# Patient Record
Sex: MALE | Race: WHITE | NOT HISPANIC OR LATINO
[De-identification: names, ages, dates, MRNs, and addresses within clinical notes are randomized per-mention and may not be internally consistent; named-entity substitution may affect disease eponyms.]

---

## 2019-02-25 ENCOUNTER — RECORD ABSTRACTING (OUTPATIENT)
Age: 59
End: 2019-02-25

## 2019-02-25 DIAGNOSIS — Z87.898 PERSONAL HISTORY OF OTHER SPECIFIED CONDITIONS: ICD-10-CM

## 2019-02-25 DIAGNOSIS — E03.9 HYPOTHYROIDISM, UNSPECIFIED: ICD-10-CM

## 2019-02-25 DIAGNOSIS — Z86.79 PERSONAL HISTORY OF OTHER DISEASES OF THE CIRCULATORY SYSTEM: ICD-10-CM

## 2019-03-07 ENCOUNTER — APPOINTMENT (OUTPATIENT)
Dept: CARDIOLOGY | Facility: CLINIC | Age: 59
End: 2019-03-07
Payer: COMMERCIAL

## 2019-03-07 VITALS
SYSTOLIC BLOOD PRESSURE: 130 MMHG | WEIGHT: 141 LBS | DIASTOLIC BLOOD PRESSURE: 72 MMHG | HEART RATE: 60 BPM | BODY MASS INDEX: 27.68 KG/M2 | HEIGHT: 60 IN

## 2019-03-07 PROCEDURE — 99212 OFFICE O/P EST SF 10 MIN: CPT | Mod: 25

## 2019-03-07 PROCEDURE — 93351 STRESS TTE COMPLETE: CPT

## 2019-03-07 RX ORDER — AMLODIPINE BESYLATE 5 MG/1
5 TABLET ORAL
Refills: 0 | Status: DISCONTINUED | COMMUNITY
End: 2019-03-07

## 2019-03-07 NOTE — DISCUSSION/SUMMARY
[FreeTextEntry1] : Patient did exceptionally well on today's stress echo. There was no evidence of exercise-induced myocardial ischemia and the patient was able to easily exercise and to Av stage IV. The function was normal at rest and post exercise. I was pleased with those results the patient was advised to maintain a program of regular exercise. His kidney function has been followed closely by a nephrologist.

## 2019-03-07 NOTE — REASON FOR VISIT
[FreeTextEntry1] : Patient comes for followup today including stress echocardiography. The patient has a number of coronary risk factors and has an EKG abnormality consisting of nonspecific ST-T wave abnormalities.

## 2019-03-07 NOTE — PHYSICAL EXAM
[General Appearance - Well Developed] : well developed [Normal Appearance] : normal appearance [Well Groomed] : well groomed [General Appearance - Well Nourished] : well nourished [No Deformities] : no deformities [General Appearance - In No Acute Distress] : no acute distress [Normal Conjunctiva] : the conjunctiva exhibited no abnormalities [Eyelids - No Xanthelasma] : the eyelids demonstrated no xanthelasmas [Normal Oral Mucosa] : normal oral mucosa [No Oral Pallor] : no oral pallor [No Oral Cyanosis] : no oral cyanosis [Normal Jugular Venous A Waves Present] : normal jugular venous A waves present [Normal Jugular Venous V Waves Present] : normal jugular venous V waves present [No Jugular Venous Aranda A Waves] : no jugular venous aranda A waves [Respiration, Rhythm And Depth] : normal respiratory rhythm and effort [Exaggerated Use Of Accessory Muscles For Inspiration] : no accessory muscle use [Auscultation Breath Sounds / Voice Sounds] : lungs were clear to auscultation bilaterally [Heart Rate And Rhythm] : heart rate and rhythm were normal [Heart Sounds] : normal S1 and S2 [Murmurs] : no murmurs present [Abdomen Soft] : soft [Abdomen Tenderness] : non-tender [Abdomen Mass (___ Cm)] : no abdominal mass palpated [Abnormal Walk] : normal gait [Gait - Sufficient For Exercise Testing] : the gait was sufficient for exercise testing [Oriented To Time, Place, And Person] : oriented to person, place, and time [Affect] : the affect was normal [Mood] : the mood was normal [No Anxiety] : not feeling anxious [Skin Color & Pigmentation] : normal skin color and pigmentation [] : no rash [No Venous Stasis] : no venous stasis [Skin Lesions] : no skin lesions [No Skin Ulcers] : no skin ulcer [No Xanthoma] : no  xanthoma was observed

## 2019-06-11 ENCOUNTER — RECORD ABSTRACTING (OUTPATIENT)
Age: 59
End: 2019-06-11

## 2019-06-11 DIAGNOSIS — Z82.49 FAMILY HISTORY OF ISCHEMIC HEART DISEASE AND OTHER DISEASES OF THE CIRCULATORY SYSTEM: ICD-10-CM

## 2019-06-11 DIAGNOSIS — Z83.3 FAMILY HISTORY OF DIABETES MELLITUS: ICD-10-CM

## 2019-06-11 DIAGNOSIS — Z84.2 FAMILY HISTORY OF OTHER DISEASES OF THE GENITOURINARY SYSTEM: ICD-10-CM

## 2019-06-11 DIAGNOSIS — K21.0 GASTRO-ESOPHAGEAL REFLUX DISEASE WITH ESOPHAGITIS: ICD-10-CM

## 2019-06-11 RX ORDER — ATORVASTATIN CALCIUM 20 MG/1
20 TABLET, FILM COATED ORAL
Refills: 0 | Status: DISCONTINUED | COMMUNITY
End: 2019-06-11

## 2019-06-11 RX ORDER — HYDROCHLOROTHIAZIDE 12.5 MG/1
12.5 CAPSULE ORAL
Refills: 0 | Status: DISCONTINUED | COMMUNITY
End: 2019-06-11

## 2019-06-11 RX ORDER — AMLODIPINE BESYLATE 2.5 MG/1
2.5 TABLET ORAL
Refills: 0 | Status: DISCONTINUED | COMMUNITY
End: 2019-06-11

## 2019-06-11 RX ORDER — LOSARTAN POTASSIUM 100 MG/1
100 TABLET, FILM COATED ORAL
Refills: 0 | Status: DISCONTINUED | COMMUNITY
End: 2019-06-11

## 2019-07-29 ENCOUNTER — APPOINTMENT (OUTPATIENT)
Dept: NEPHROLOGY | Facility: CLINIC | Age: 59
End: 2019-07-29

## 2019-09-04 ENCOUNTER — OTHER (OUTPATIENT)
Age: 59
End: 2019-09-04

## 2019-09-17 ENCOUNTER — APPOINTMENT (OUTPATIENT)
Dept: NEPHROLOGY | Facility: CLINIC | Age: 59
End: 2019-09-17
Payer: COMMERCIAL

## 2019-09-17 VITALS
DIASTOLIC BLOOD PRESSURE: 70 MMHG | WEIGHT: 145 LBS | HEART RATE: 72 BPM | BODY MASS INDEX: 24.16 KG/M2 | RESPIRATION RATE: 16 BRPM | HEIGHT: 65 IN | SYSTOLIC BLOOD PRESSURE: 126 MMHG

## 2019-09-17 DIAGNOSIS — Z86.19 PERSONAL HISTORY OF OTHER INFECTIOUS AND PARASITIC DISEASES: ICD-10-CM

## 2019-09-17 DIAGNOSIS — Z78.9 OTHER SPECIFIED HEALTH STATUS: ICD-10-CM

## 2019-09-17 DIAGNOSIS — N18.3 CHRONIC KIDNEY DISEASE, STAGE 3 (MODERATE): ICD-10-CM

## 2019-09-17 DIAGNOSIS — N28.1 CYST OF KIDNEY, ACQUIRED: ICD-10-CM

## 2019-09-17 PROCEDURE — 99214 OFFICE O/P EST MOD 30 MIN: CPT

## 2019-09-17 RX ORDER — IRBESARTAN 300 MG/1
300 TABLET, FILM COATED ORAL
Refills: 0 | Status: DISCONTINUED | COMMUNITY
End: 2019-09-17

## 2019-09-17 NOTE — CONSULT LETTER
[Dear  ___] : Dear  [unfilled], [Please see my note below.] : Please see my note below. [Consult Letter:] : I had the pleasure of evaluating your patient, [unfilled]. [Sincerely,] : Sincerely, [Consult Closing:] : Thank you very much for allowing me to participate in the care of this patient.  If you have any questions, please do not hesitate to contact me. [DrLeon  ___] : Dr. CUMMINGS [FreeTextEntry3] : Earl

## 2019-09-17 NOTE — ASSESSMENT
[FreeTextEntry1] : Ramses Jackson is a 59-year-old,  male who who has a history of late Stage III CKD, now possible stage IV CKD related to urinary reflux. His blood pressures have been well controlled. His renal function has worsened slightly though this may just be related to the warm weather we have been having.  The urine protein to creatinine ratio has again decreased to 494 mg/g (09/12/2019).  Out of concern for the proteinuria in the past, a workup was submitted (SPEP (-), urine immunofixation (-), Hep Bs Ag (-), Hep C antibody (-), CONCEPCIÓN (-), RA (-). An ESR was 6 mm/hour in July 2016.\par \par (1) Solitary functioning kidney with nephrotic-range proteinuria in the past and a negative workup. \par (2) CKD stage IV  secondary to reflux. No active reflux at his last visit to urology.\par (3) Nephrotic range proteinuria in the past. It is currently is 494 mg per gram of creatinine.\par (4) Complex renal cyst. No change in structure on the last imaging study. Low suspicion for a malignancy.\par (5) HbA1c 5.4%. Mr. Jackson does not have diabetes mellitus.\par \par RECOMMENDATIONS:\par \par (1) Continue the current medication regimen.\par \par (2) We talked about ways to protect the kidneys: \par -Good blood pressure control\par -Avoid the use of NSAIDS (ibuprofen, Motrin, Aleve,Celebrex, etc.).  Okay to use Tylenol.\par -Avoid salt 'like the plague'.  Limit sodium intake.  Do not add salt to your food.\par -Limit the intake of animal products.  Avoid high animal protein diets.  I have recommended a diet with 0.8 grams of protein per kg of body mass\par -Stay well hydrated.\par -Good cholesterol control.\par (3) Continue the vitamin D to 2000 IU BID alternating with 2000 IU once daily.\par (4) I ordered repeat lab studies to be done in 3 months. \par \par Mr. Jackson will read the book "The Luther Study" by Dr. KARIS Forde.\par \par

## 2019-09-17 NOTE — PHYSICAL EXAM
[General Appearance - Alert] : alert [Neck Appearance] : the appearance of the neck was normal [Sclera] : the sclera and conjunctiva were normal [General Appearance - In No Acute Distress] : in no acute distress [] : no respiratory distress [Exaggerated Use Of Accessory Muscles For Inspiration] : no accessory muscle use [Respiration, Rhythm And Depth] : normal respiratory rhythm and effort [Heart Sounds] : normal S1 and S2 [Heart Sounds Gallop] : no gallops [Heart Rate And Rhythm] : heart rate was normal and rhythm regular [Heart Sounds Pericardial Friction Rub] : no pericardial rub [Murmurs] : no murmurs [Bowel Sounds] : normal bowel sounds [Edema] : there was no peripheral edema [Abdomen Soft] : soft [Abdomen Tenderness] : non-tender [No Spinal Tenderness] : no spinal tenderness [No CVA Tenderness] : no ~M costovertebral angle tenderness [Involuntary Movements] : no involuntary movements were seen [Abnormal Walk] : normal gait [Skin Color & Pigmentation] : normal skin color and pigmentation [Skin Turgor] : normal skin turgor [No Focal Deficits] : no focal deficits [FreeTextEntry1] : no asterixis or clonus

## 2019-11-12 ENCOUNTER — APPOINTMENT (OUTPATIENT)
Dept: NEPHROLOGY | Facility: CLINIC | Age: 59
End: 2019-11-12
Payer: COMMERCIAL

## 2019-11-12 VITALS
BODY MASS INDEX: 23.32 KG/M2 | WEIGHT: 140 LBS | RESPIRATION RATE: 16 BRPM | SYSTOLIC BLOOD PRESSURE: 122 MMHG | HEART RATE: 60 BPM | OXYGEN SATURATION: 98 % | DIASTOLIC BLOOD PRESSURE: 70 MMHG | HEIGHT: 65 IN

## 2019-11-12 DIAGNOSIS — Z87.440 PERSONAL HISTORY OF URINARY (TRACT) INFECTIONS: ICD-10-CM

## 2019-11-12 DIAGNOSIS — N13.70 VESICOURETERAL-REFLUX, UNSPECIFIED: ICD-10-CM

## 2019-11-12 PROCEDURE — 99214 OFFICE O/P EST MOD 30 MIN: CPT

## 2019-11-12 NOTE — HISTORY OF PRESENT ILLNESS
[FreeTextEntry1] : Ramses Jackson is a 59-year-old, male who has a history of Stage III CKD related to urinary reflux. He called earlier today with complaints of bilateral lower back pain (in the kidney areas) and urinary frequency that has been going on for the past two weeks "on and off" though worsened today.  He denies having any fevers, chills, visible hematuria or dysuria.  His urine is clear and pale yellow in color.  His blood tests were done on November 5, 2019. He was told his serum creatinine is 2.7 mg/dL.

## 2019-11-12 NOTE — PHYSICAL EXAM
[General Appearance - Alert] : alert [General Appearance - In No Acute Distress] : in no acute distress [Sclera] : the sclera and conjunctiva were normal [] : no respiratory distress [Neck Appearance] : the appearance of the neck was normal [Respiration, Rhythm And Depth] : normal respiratory rhythm and effort [Exaggerated Use Of Accessory Muscles For Inspiration] : no accessory muscle use [Heart Rate And Rhythm] : heart rate was normal and rhythm regular [Heart Sounds] : normal S1 and S2 [Heart Sounds Gallop] : no gallops [Heart Sounds Pericardial Friction Rub] : no pericardial rub [Murmurs] : no murmurs [Edema] : there was no peripheral edema [Bowel Sounds] : normal bowel sounds [Abdomen Soft] : soft [Abdomen Tenderness] : non-tender [No CVA Tenderness] : no ~M costovertebral angle tenderness [No Spinal Tenderness] : no spinal tenderness [Abnormal Walk] : normal gait [Skin Color & Pigmentation] : normal skin color and pigmentation [Involuntary Movements] : no involuntary movements were seen [Skin Turgor] : normal skin turgor [No Focal Deficits] : no focal deficits [Oriented To Time, Place, And Person] : oriented to person, place, and time [Impaired Insight] : insight and judgment were intact [Affect] : the affect was normal [Mood] : the mood was normal [FreeTextEntry1] : no asterixis or clonus

## 2019-11-12 NOTE — CONSULT LETTER
[Dear  ___] : Dear  [unfilled], [Courtesy Letter:] : I had the pleasure of seeing your patient, [unfilled], in my office today. [Please see my note below.] : Please see my note below. [Consult Closing:] : Thank you very much for allowing me to participate in the care of this patient.  If you have any questions, please do not hesitate to contact me. [Sincerely,] : Sincerely, [FreeTextEntry3] : Earl

## 2019-11-12 NOTE — ASSESSMENT
[FreeTextEntry1] : Ramses Jackson is a 59-year-old,  male who has stage IV CKD related to prior urinary reflux and a solitary functioning kidney.  His blood pressures continue to be well controlled.  His renal function (see below) has changed only minimally since the early summer.   His recent BUN/creatinine trend is as follows:  31/2.55 (06/10/2019), 29/2.64 (09/12/2019), -/2.7 (most recent labs per patient).\par \par Mr. Jackson is here today because of urinary frequency and back pain. Although the majority of the pain appears to be in the bilateral lower back areas well below the left kidney, some of his discomfort is around the left kidney.  His current history does not seem to be consistent with a urinary tract infection.  I can not rule out either urinary retention in the bladder or urinary reflux.  Neither can I rule out a recent acute kidney injury event that is recovering with large urine production. \par \par Other issues include: \par \par (1) Solitary functioning kidney with nephrotic-range proteinuria in the past and a negative workup. \par (2) CKD stage IV  secondary to reflux. No active reflux at his last visit to urology.\par (3) Complex renal cyst. No change in structure on the last imaging study. Low suspicion for a malignancy.\par \par \par RECOMMENDATIONS:\par \par (1) Renal ultrasound and bladder scan\par (2) Urinalysis and urine culture and sensitivity.\par (3) Urine osmolality.\par \par \par

## 2019-11-12 NOTE — REVIEW OF SYSTEMS
[Feeling Tired] : feeling tired [As Noted in HPI] : as noted in HPI [Joint Pain] : joint pain [Negative] : Heme/Lymph [FreeTextEntry8] : nocturia x 1-2. [FreeTextEntry9] : chronic knee pain, recent pain above and below the knees in addition

## 2020-04-06 ENCOUNTER — APPOINTMENT (OUTPATIENT)
Dept: NEPHROLOGY | Facility: CLINIC | Age: 60
End: 2020-04-06

## 2020-04-06 ENCOUNTER — APPOINTMENT (OUTPATIENT)
Dept: NEPHROLOGY | Facility: CLINIC | Age: 60
End: 2020-04-06
Payer: COMMERCIAL

## 2020-04-06 PROCEDURE — 99214 OFFICE O/P EST MOD 30 MIN: CPT | Mod: 95

## 2020-04-06 NOTE — REVIEW OF SYSTEMS
[As Noted in HPI] : as noted in HPI [Joint Pain] : joint pain [Negative] : Heme/Lymph [FreeTextEntry2] : No longer feels tired.  [FreeTextEntry8] : nocturia x 1-2.  Usually once.  [FreeTextEntry9] : chronic knee pain, this improved with exercise.

## 2020-04-06 NOTE — HISTORY OF PRESENT ILLNESS
[Home] : at home, [unfilled] , at the time of the visit. [Medical Office: ([facility])___] : at ~his/her~ medical office located in [place] [Spouse] : spouse [Patient] : the patient [FreeTextEntry4] : Verónica Schmidt [FreeTextEntry1] : Ramses Jackson is a 59-year-old, male who has a history of Stage III CKD related to urinary reflux. He is feeling well.   His blood pressure this morning was 120/78.  He is without any nausea or vomiting. His full ROS is below.

## 2020-04-06 NOTE — ASSESSMENT
[FreeTextEntry1] : Ramses Jackson is a 59-year-old,  male who has stage IV CKD related to prior urinary reflux and a solitary functioning kidney.  His blood pressures continue to be well controlled.  His renal function (see below) has changed only minimally since the early summer.   His recent BUN/creatinine trend is as follows:  31/2.55 (06/10/2019), 29/2.64 (09/12/2019), -/2.7, 33/2.5 (03/12/2020).   He is without any nausea or vomiting. His blood pressure is well controlled.  His most recent urine protein to creatinine ratio is 975 mg per gram.  His lipid panel is good (total cholesterol 155 mg/dL,  mg/dL, HDL 50 mg/dL, LDL 73 mg/dL).  His vitamin D level is 73 ng/mL (on vitamin 2000 IU twice daily).\par \par Other issues include: \par \par (1) Solitary functioning kidney with nephrotic-range proteinuria in the past and a negative workup. \par (2) CKD stage IV  secondary to reflux. No active reflux at his last visit to urology.\par (3) Complex renal cyst. No change in structure on the last imaging study. Low suspicion for a malignancy.\par \par \par RECOMMENDATIONS:\par \par (1) Lower the vitamin D to 2000 IU once daily.\par (2) Continue the rest of the medical regimen. \par (3) Mr. Jackson saw [facility] in June 2019.  He was told they will see him again when the eGFR is under 20 cc per minute.  \par (4) Repeat lab studies in 3 months. \par \par We talked about the need to avoid contact with people outside of his family, all of whom are remaining at home.

## 2020-05-08 ENCOUNTER — APPOINTMENT (OUTPATIENT)
Dept: CARDIOLOGY | Facility: CLINIC | Age: 60
End: 2020-05-08

## 2020-09-03 ENCOUNTER — APPOINTMENT (OUTPATIENT)
Dept: NEPHROLOGY | Facility: CLINIC | Age: 60
End: 2020-09-03
Payer: COMMERCIAL

## 2020-09-03 VITALS
OXYGEN SATURATION: 98 % | RESPIRATION RATE: 16 BRPM | HEIGHT: 65 IN | BODY MASS INDEX: 23.66 KG/M2 | HEART RATE: 57 BPM | TEMPERATURE: 97.3 F | WEIGHT: 142 LBS | DIASTOLIC BLOOD PRESSURE: 64 MMHG | SYSTOLIC BLOOD PRESSURE: 118 MMHG

## 2020-09-03 PROCEDURE — 99214 OFFICE O/P EST MOD 30 MIN: CPT

## 2020-09-03 NOTE — PHYSICAL EXAM
[General Appearance - Alert] : alert [General Appearance - In No Acute Distress] : in no acute distress [Extraocular Movements] : extraocular movements were intact [Sclera] : the sclera and conjunctiva were normal [] : no respiratory distress [Respiration, Rhythm And Depth] : normal respiratory rhythm and effort [Neck Appearance] : the appearance of the neck was normal [Exaggerated Use Of Accessory Muscles For Inspiration] : no accessory muscle use [Heart Rate And Rhythm] : heart rate was normal and rhythm regular [Auscultation Breath Sounds / Voice Sounds] : lungs were clear to auscultation bilaterally [Heart Sounds Gallop] : no gallops [Heart Sounds] : normal S1 and S2 [Murmurs] : no murmurs [Edema] : there was no peripheral edema [Heart Sounds Pericardial Friction Rub] : no pericardial rub [Normal Sphincter Tone] : normal sphincter tone [No Rectal Mass] : no rectal mass [No CVA Tenderness] : no ~M costovertebral angle tenderness [No Spinal Tenderness] : no spinal tenderness [Abnormal Walk] : normal gait [Involuntary Movements] : no involuntary movements were seen [Nail Clubbing] : no clubbing  or cyanosis of the fingernails [Skin Turgor] : normal skin turgor [Skin Color & Pigmentation] : normal skin color and pigmentation [No Focal Deficits] : no focal deficits [Oriented To Time, Place, And Person] : oriented to person, place, and time [Impaired Insight] : insight and judgment were intact [Mood] : the mood was normal [Affect] : the affect was normal

## 2020-09-03 NOTE — CONSULT LETTER
[Dear  ___] : Dear  [unfilled], [Courtesy Letter:] : I had the pleasure of seeing your patient, [unfilled], in my office today. [Please see my note below.] : Please see my note below. [Consult Closing:] : Thank you very much for allowing me to participate in the care of this patient.  If you have any questions, please do not hesitate to contact me. [DrLeon  ___] : Dr. CUMMINGS [Sincerely,] : Sincerely, [FreeTextEntry3] : Earl

## 2020-09-03 NOTE — REVIEW OF SYSTEMS
[As Noted in HPI] : as noted in HPI [Negative] : Heme/Lymph [FreeTextEntry8] : nocturia x 0-2.  Usually once.  [FreeTextEntry9] : prior knee pain has resolved

## 2020-09-03 NOTE — ASSESSMENT
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  His blood pressures continue to be well controlled.  His renal function (see below) has changed only minimally since the early summer.   His recent BUN/creatinine trend is as follows:  31/2.55 (06/10/2019), 29/2.64 (09/12/2019), -/2.7, 33/2.5 (03/12/2020), and most recently 31/12.50 (08/28/2020).   He is without any nausea or vomiting. His blood pressure is well controlled.  His most recent urine protein to creatinine ratio is 885 mg per gram.  His lipid panel is good (total cholesterol 160 mg/dL,  mg/dL, HDL 48 mg/dL, LDL 85 mg/dL).  His vitamin D level is 63.9 ng/mL (on vitamin 2000 IU once daily).\par \par Other issues include: \par \par (1) Solitary functioning kidney with nephrotic-range proteinuria in the past and a negative workup. \par (2) CKD stage IV  secondary to reflux. No active reflux at his last visit to urology.  Currently stable renal function.\par (3) Complex renal cyst. No change in structure on the last imaging study. Low suspicion for a malignancy.\par \par \par RECOMMENDATIONS:\par \par (1) I made no changes to the medication regimen.\par (2) We talked about home dialysis (PD and HD).\par (3) Mr. Jackson saw [facility] in June 2019.  He was told they will see him again when the eGFR is under 20 cc per minute.  \par (4) We talked about ways to protect the kidneys:\par -Good blood pressure control\par -Avoid the use of NSAIDS (ibuprofen, Motrin, Aleve,Celebrex, etc.).  Okay to use Tylenol.\par -Avoid salt 'like the plague'.  Limit sodium intake.  Do not add salt to your food.\par -Limit the intake of animal products.  Avoid high animal protein diets.\par -Stay well hydrated.\par -Good cholesterol control.\par (5) Repeat lab studies in 3 months. \par \par We talked about the need to avoid contact with people outside of his family, all of whom are remaining at home.

## 2020-09-03 NOTE — HISTORY OF PRESENT ILLNESS
[FreeTextEntry1] : Ramses Jackson is a 60-year-old, male who has a history of CKD related to urinary reflux. He is feeling well. His last blood pressure at home is good.  The systolic pressures run under 120 mm Hg, the diastolic pressures run no higher than 80 mm Hg.   He is without any nausea or vomiting. His full ROS is below.

## 2020-09-17 ENCOUNTER — APPOINTMENT (OUTPATIENT)
Dept: NEPHROLOGY | Facility: CLINIC | Age: 60
End: 2020-09-17
Payer: COMMERCIAL

## 2020-09-17 VITALS
HEART RATE: 67 BPM | SYSTOLIC BLOOD PRESSURE: 146 MMHG | DIASTOLIC BLOOD PRESSURE: 70 MMHG | BODY MASS INDEX: 24.16 KG/M2 | OXYGEN SATURATION: 98 % | RESPIRATION RATE: 16 BRPM | WEIGHT: 145 LBS | TEMPERATURE: 98.1 F | HEIGHT: 65 IN

## 2020-09-17 VITALS — SYSTOLIC BLOOD PRESSURE: 134 MMHG | DIASTOLIC BLOOD PRESSURE: 66 MMHG

## 2020-09-17 PROCEDURE — 99213 OFFICE O/P EST LOW 20 MIN: CPT

## 2020-09-17 NOTE — HISTORY OF PRESENT ILLNESS
[FreeTextEntry1] : Ramses Jackson is a 60-year-old, male who has a history of CKD related to urinary reflux. He is feeling well. His last blood pressure at home is good.  The systolic generally run under 120 mm Hg, the diastolic pressures was running no higher than 80 mm Hg. He reports having a high salt meal 3 days ago. Since then his blood pressures have been elevated and running between 140/77 and 168/80.  He took an extra dose of telmisartan 80 mg and his blood pressure decreased to 118/68.  He feels well and is without complaints.

## 2020-09-17 NOTE — ASSESSMENT
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  His blood pressures continue to be well controlled.  His renal function (see below) has changed only minimally since the early summer.   His recent BUN/creatinine trend is as follows:  31/2.55 (06/10/2019), 29/2.64 (09/12/2019), -/2.7, 33/2.5 (03/12/2020), and most recently 31/2.50 (08/28/2020).   He is without any nausea or vomiting. His blood pressure is well controlled.  His most recent urine protein to creatinine ratio is 885 mg per gram.  His lipid panel is good (total cholesterol 160 mg/dL,  mg/dL, HDL 48 mg/dL, LDL 85 mg/dL).  His vitamin D level is 63.9 ng/mL (on vitamin 2000 IU once daily).\par \par Other issues include: \par \par (1) Solitary functioning kidney with nephrotic-range proteinuria in the past and a negative workup. \par (2) CKD stage IV  secondary to reflux. No active reflux at his last visit to urology.  Currently stable renal function.\par (3) Complex renal cyst. No change in structure on the last imaging study. Low suspicion for a malignancy.\par (4) Recently elevated blood pressures (following a high salt meal).  Blood pressure on his Omron is 144/80 with a pulse of 62.  His blood pressure is fairly close to our office measurements. I repeated the blood pressure on the right arm: 136/68.\par \par \par RECOMMENDATIONS:\par \par (1) Take telmisartan 80 mg once daily.\par (2) I gave Mr. Jackson a prescriiption for  hydrochlorothiazide 12.5 mg once daily.  He will not take it unless his blood pressures continue to run high at home.  Prior to starting it, he will call me. \par (3) Repeat the BMP in one month.\par (4) We talked about home dialysis (PD and HD) during our last meeiting.  I did not readress it here.\par (5) Mr. Jackson saw [facility] in June 2019.  He was told they will see him again when the eGFR is under 20 cc per minute.  \par (6 We talked about ways to protect the kidneys:\par -Good blood pressure control\par -Avoid the use of NSAIDS (ibuprofen, Motrin, Aleve,Celebrex, etc.).  Okay to use Tylenol.\par -Avoid salt 'like the plague'.  Limit sodium intake.  Do not add salt to your food.\par -Limit the intake of animal products.  Avoid high animal protein diets.\par -Stay well hydrated.\par -Good cholesterol control.\par

## 2020-09-17 NOTE — CONSULT LETTER
[Dear  ___] : Dear  [unfilled], [Courtesy Letter:] : I had the pleasure of seeing your patient, [unfilled], in my office today. [Please see my note below.] : Please see my note below. [Consult Closing:] : Thank you very much for allowing me to participate in the care of this patient.  If you have any questions, please do not hesitate to contact me. [Sincerely,] : Sincerely, [FreeTextEntry3] : Earl [DrLeon  ___] : Dr. CUMMINGS

## 2020-09-17 NOTE — PHYSICAL EXAM
[General Appearance - Alert] : alert [General Appearance - In No Acute Distress] : in no acute distress [Extraocular Movements] : extraocular movements were intact [Neck Appearance] : the appearance of the neck was normal [] : no respiratory distress [Respiration, Rhythm And Depth] : normal respiratory rhythm and effort [Exaggerated Use Of Accessory Muscles For Inspiration] : no accessory muscle use [Auscultation Breath Sounds / Voice Sounds] : lungs were clear to auscultation bilaterally [Heart Rate And Rhythm] : heart rate was normal and rhythm regular [Heart Sounds] : normal S1 and S2 [Heart Sounds Gallop] : no gallops [Murmurs] : no murmurs [Heart Sounds Pericardial Friction Rub] : no pericardial rub [Edema] : there was no peripheral edema [Normal Sphincter Tone] : normal sphincter tone [No Rectal Mass] : no rectal mass [Abnormal Walk] : normal gait [Involuntary Movements] : no involuntary movements were seen [Skin Color & Pigmentation] : normal skin color and pigmentation [Skin Turgor] : normal skin turgor [No Focal Deficits] : no focal deficits [Oriented To Time, Place, And Person] : oriented to person, place, and time [Impaired Insight] : insight and judgment were intact [Affect] : the affect was normal [Mood] : the mood was normal

## 2020-12-01 ENCOUNTER — APPOINTMENT (OUTPATIENT)
Dept: NEPHROLOGY | Facility: CLINIC | Age: 60
End: 2020-12-01
Payer: COMMERCIAL

## 2020-12-01 VITALS
HEIGHT: 65 IN | RESPIRATION RATE: 16 BRPM | SYSTOLIC BLOOD PRESSURE: 148 MMHG | BODY MASS INDEX: 23.99 KG/M2 | OXYGEN SATURATION: 100 % | DIASTOLIC BLOOD PRESSURE: 70 MMHG | TEMPERATURE: 98 F | WEIGHT: 144 LBS | HEART RATE: 62 BPM

## 2020-12-01 PROCEDURE — 99072 ADDL SUPL MATRL&STAF TM PHE: CPT

## 2020-12-01 PROCEDURE — 99214 OFFICE O/P EST MOD 30 MIN: CPT

## 2020-12-01 RX ORDER — TELMISARTAN 80 MG/1
80 TABLET ORAL DAILY
Refills: 0 | Status: DISCONTINUED | COMMUNITY
End: 2020-12-01

## 2020-12-01 NOTE — CONSULT LETTER
[Dear  ___] : Dear  [unfilled], [Courtesy Letter:] : I had the pleasure of seeing your patient, [unfilled], in my office today. [Please see my note below.] : Please see my note below. [Consult Closing:] : Thank you very much for allowing me to participate in the care of this patient.  If you have any questions, please do not hesitate to contact me. [Sincerely,] : Sincerely, [DrLeon  ___] : Dr. CUMMINGS [FreeTextEntry3] : Earl

## 2020-12-01 NOTE — HISTORY OF PRESENT ILLNESS
[FreeTextEntry1] : Ramses Jackson is a 60-year-old, male who has a history of CKD related to urinary reflux. He is feeling well. He is here regarding a recent worsening of his serum creatinine.  He had a "little fever" around 6-7 weeks ago and took Tylenol only.  He has not had any diarrhea.  He has had no recent changes to his medication regimen.  Although HCTZ 12.5 mg daily is listed in his medication regimen, he has not taken it.  He stopped taking Micardis today. His blood pressures at home have been running around 110-115/70.  He increased the dose of an Ayurvedic (Muktavati) medication around 2 months ago.   Mr. Jackson feels "okay but a little concerned".

## 2020-12-01 NOTE — ASSESSMENT
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   His recent BUN/creatinine trend is as follows:  31/2.55 (06/10/2019), 29/2.64 (09/12/2019), -/2.7, 33/2.5 (03/12/2020),  31/2.50 (08/28/2020), and most recently 40/4.16 (11/24/2020).  He is without any nausea or vomiting. His blood pressure has been well controlled on an ARB though is mildly elevated today, the first day that he is not taking it.  He has been taking an Ayurvedic medication, the dose of which he doubled around 2 months.ago.   \par \par Other issues include: \par \par (1) Solitary functioning kidney with nephrotic-range proteinuria in the past and a negative workup. \par (2) CKD stage IV  secondary to reflux. No active reflux at his last visit to urology.  \par (3) KYLE of unclear etiology. \par (4) Complex renal cyst. No change in structure on the last imaging study. Low suspicion for a malignancy.\par (5) Hypertension\par \par RECOMMENDATIONS:\par \par (1) Stop Telmisartan\par (2) Stop the Lizzy Vati for now. \par (3) Stay well hydrated.\par (4) Start amlodipine 5 mg once daily\par (5) Repeat blood tests below in 1 week\par (6) Repeat renal ultrasound\par (7) We talked about home dialysis (PD and HD) during a prior meeiting.  I did not readress it here.\par (8) Mr. Jackson saw [facility] in June 2019.  He was told they will see him again when the eGFR is under 20 cc per minute.  \par (9) Limit potassium in the diet\par (10) Return in 1 month

## 2020-12-03 ENCOUNTER — APPOINTMENT (OUTPATIENT)
Dept: NEPHROLOGY | Facility: CLINIC | Age: 60
End: 2020-12-03
Payer: COMMERCIAL

## 2020-12-21 PROBLEM — Z87.440 HISTORY OF URINARY TRACT INFECTION: Status: RESOLVED | Noted: 2019-06-11 | Resolved: 2020-12-21

## 2021-01-05 ENCOUNTER — APPOINTMENT (OUTPATIENT)
Dept: NEPHROLOGY | Facility: CLINIC | Age: 61
End: 2021-01-05
Payer: COMMERCIAL

## 2021-01-05 VITALS
HEART RATE: 75 BPM | DIASTOLIC BLOOD PRESSURE: 64 MMHG | WEIGHT: 137 LBS | SYSTOLIC BLOOD PRESSURE: 138 MMHG | TEMPERATURE: 98 F | RESPIRATION RATE: 16 BRPM | OXYGEN SATURATION: 99 % | BODY MASS INDEX: 22.82 KG/M2 | HEIGHT: 65 IN

## 2021-01-05 DIAGNOSIS — R35.0 FREQUENCY OF MICTURITION: ICD-10-CM

## 2021-01-05 PROCEDURE — 99072 ADDL SUPL MATRL&STAF TM PHE: CPT

## 2021-01-05 PROCEDURE — 99214 OFFICE O/P EST MOD 30 MIN: CPT

## 2021-01-05 RX ORDER — AMLODIPINE BESYLATE 10 MG/1
10 TABLET ORAL
Refills: 0 | Status: DISCONTINUED | COMMUNITY
End: 2021-01-05

## 2021-01-05 RX ORDER — AMLODIPINE BESYLATE 5 MG/1
5 TABLET ORAL DAILY
Qty: 90 | Refills: 1 | Status: DISCONTINUED | COMMUNITY
Start: 2020-12-01 | End: 2021-01-05

## 2021-01-05 NOTE — ASSESSMENT
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   His recent BUN/creatinine trend is as follows:  31/2.55 (06/10/2019), 29/2.64 (09/12/2019), -/2.7, 33/2.5 (03/12/2020),  31/2.50 (08/28/2020),  40/4.16 (11/24/2020).  Following the labs of 11/24 his ARB and thiazide diuretic were stopped.  He is currently taking the ARB with amlodipine.  The repeat BUN/creatinine levels are currently 32/2.56 (01/04/2020). He is without any nausea or vomiting. His blood pressure has been mildly elevated though improved on the current regimen.  He is struggling with nocturia and urinary frequency and constipation. \par \par Other issues include: \par \par (1) Solitary functioning kidney with nephrotic-range proteinuria in the past and a negative workup. \par (2) CKD stage IV  secondary to reflux. No active reflux at his last visit to urology.  \par (3) KYLE has resolved. \par (4) Complex renal cyst. No change in structure on the last imaging study. Low suspicion for a malignancy.\par (5) Hypertension Blood pressure is mildly above goa. \par \par RECOMMENDATIONS:\par \par (1) Continue telmisartan 80 mg once daily. \par (2) Stop the amlodipine.\par (3) Start carvedilol 3.125 mg twice daily\par (4) Stay well hydrated.\par (5) Keep salt out of the diet. \par (6) Follow the blood pressures at home.\par (7) We talked about home dialysis (PD and HD) during a prior meeiting.  I did not readdress it today.\par (8) Mr. Jackson saw [facility] in June 2019.  He was told they will see him again when the eGFR is under 20 cc per minute.  \par

## 2021-01-05 NOTE — CONSULT LETTER
[Courtesy Letter:] : I had the pleasure of seeing your patient, [unfilled], in my office today. [Please see my note below.] : Please see my note below. [Consult Closing:] : Thank you very much for allowing me to participate in the care of this patient.  If you have any questions, please do not hesitate to contact me. [Sincerely,] : Sincerely, [DrLeon  ___] : Dr. CUMMINGS [Dear  ___] : Dear  [unfilled], [FreeTextEntry3] : Earl

## 2021-01-05 NOTE — PHYSICAL EXAM
[General Appearance - Alert] : alert [General Appearance - In No Acute Distress] : in no acute distress [Extraocular Movements] : extraocular movements were intact [Neck Appearance] : the appearance of the neck was normal [] : no respiratory distress [Respiration, Rhythm And Depth] : normal respiratory rhythm and effort [Exaggerated Use Of Accessory Muscles For Inspiration] : no accessory muscle use [Auscultation Breath Sounds / Voice Sounds] : lungs were clear to auscultation bilaterally [Heart Rate And Rhythm] : heart rate was normal and rhythm regular [Heart Sounds] : normal S1 and S2 [Heart Sounds Gallop] : no gallops [Murmurs] : no murmurs [Heart Sounds Pericardial Friction Rub] : no pericardial rub [Edema] : there was no peripheral edema [Normal Sphincter Tone] : normal sphincter tone [No Rectal Mass] : no rectal mass [Abnormal Walk] : normal gait [Involuntary Movements] : no involuntary movements were seen [Skin Color & Pigmentation] : normal skin color and pigmentation [Skin Turgor] : normal skin turgor [No Focal Deficits] : no focal deficits [Oriented To Time, Place, And Person] : oriented to person, place, and time [Impaired Insight] : insight and judgment were intact [Affect] : the affect was normal [Mood] : the mood was normal [Bowel Sounds] : normal bowel sounds [Abdomen Soft] : soft [Abdomen Tenderness] : non-tender

## 2021-01-05 NOTE — REVIEW OF SYSTEMS
[As Noted in HPI] : as noted in HPI [Negative] : Heme/Lymph [Lower Ext Edema] : lower extremity edema [Constipation] : constipation [FreeTextEntry8] : nocturia x 3-4,  urinary frequency during the days

## 2021-01-05 NOTE — HISTORY OF PRESENT ILLNESS
[FreeTextEntry1] : Ramses Jackson is a 60-year-old, male who has a history of CKD related to urinary reflux.  I saw him on 12/03/2020 regarding a worsening of the renal function.  I had him stop the Micardis and hydrochlorothiazide.  His blood pressures have recently been elevated.  The amlodipine started in December was increased to 10 mg daily and Micardis was added back at 80 mg daily.  He is currently feeling "okay".  He feels a little nervous. He has developed urinary frequency since starting amlodipine. \par

## 2021-01-28 RX ORDER — CARVEDILOL 3.12 MG/1
3.12 TABLET, FILM COATED ORAL TWICE DAILY
Qty: 180 | Refills: 0 | Status: DISCONTINUED | COMMUNITY
Start: 2021-01-05 | End: 2021-01-28

## 2021-04-06 ENCOUNTER — APPOINTMENT (OUTPATIENT)
Dept: NEPHROLOGY | Facility: CLINIC | Age: 61
End: 2021-04-06
Payer: COMMERCIAL

## 2021-04-06 VITALS
SYSTOLIC BLOOD PRESSURE: 118 MMHG | WEIGHT: 132 LBS | BODY MASS INDEX: 21.99 KG/M2 | HEART RATE: 50 BPM | OXYGEN SATURATION: 99 % | DIASTOLIC BLOOD PRESSURE: 56 MMHG | HEIGHT: 65 IN | TEMPERATURE: 97.5 F | RESPIRATION RATE: 16 BRPM

## 2021-04-06 PROCEDURE — 99072 ADDL SUPL MATRL&STAF TM PHE: CPT

## 2021-04-06 PROCEDURE — 99214 OFFICE O/P EST MOD 30 MIN: CPT

## 2021-04-06 NOTE — HISTORY OF PRESENT ILLNESS
[FreeTextEntry1] : Ramses Jackson is a 60-year-old, male who has a history of CKD related to urinary reflux.  I saw him on 12/03/2020 regarding a worsening of the renal function.  I had him stop the Micardis and hydrochlorothiazide.  His blood pressures increased and I modified his medication regimen.\par \par Mr. Jackson is here for follow up. He feels well although at times he feels tired.  His heart rate was dipping into the 40's and he cut his metoprolol dose in half.  His blood pressures increased.    \par

## 2021-04-06 NOTE — ASSESSMENT
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   His recent BUN/creatinine trend is as follows:  31/2.55 (06/10/2019), 29/2.64 (09/12/2019), -/2.7, 33/2.5 (03/12/2020),  31/2.50 (08/28/2020),  40/4.16 (11/24/2020).  Following the labs of 11/24 his ARB and thiazide diuretic were stopped.  He is currently taking the ARB with metoprolol. The repeat BUN/creatinine levels have trended as follows: 32/2.56 (01/04/2020), 262.33 (02/08/2021), 31/2.46 (04/02/2021). . He is without any nausea or vomiting. His blood pressure has been excellent (with an occasional elevated blood pressure at nights or when he wakes up because he is unable to sleep).   His constipation has been controlled with Metamucil.  \par \par Other issues include: \par \par (1) Solitary functioning kidney with nephrotic-range proteinuria in the past and a negative workup. \par (2) CKD stage IV  secondary to reflux. No active reflux at his last visit to urology.  \par (3) KYLE has resolved. \par (4) Complex renal cyst. No change in structure on the last imaging study. Low suspicion for a malignancy.\par (5) Hypertension Blood pressure is excellent today. Mr. Jackson became bradycardic on carvedilol 3.125 mg BID\par (6) Feeling tired. The beta blocker might be contributing to this. \par \par RECOMMENDATIONS:\par \par (1) Continue telmisartan 80 mg once daily. \par (2) Decrease the metoprolol succinate from 25 mg daily to 12.5 mg daily. \par (3) Restart amlodipine at the dose of 2.5 mg daily.\par (4) Keep salt out of the diet. \par (5) Follow the blood pressures at home.\par (6) We talked about home dialysis (PD and HD) during a prior meeiting.  I did not readdress it today.\par (8) Mr. Jackson saw [facility] in June 2019.  He was told they will see him again when the eGFR is under 20 cc per minute.  It is currently 27 cc per minute. \par

## 2021-04-06 NOTE — PHYSICAL EXAM
[General Appearance - Alert] : alert [General Appearance - In No Acute Distress] : in no acute distress [Sclera] : the sclera and conjunctiva were normal [Extraocular Movements] : extraocular movements were intact [Neck Appearance] : the appearance of the neck was normal [] : no respiratory distress [Respiration, Rhythm And Depth] : normal respiratory rhythm and effort [Exaggerated Use Of Accessory Muscles For Inspiration] : no accessory muscle use [Auscultation Breath Sounds / Voice Sounds] : lungs were clear to auscultation bilaterally [Heart Rate And Rhythm] : heart rate was normal and rhythm regular [Heart Sounds] : normal S1 and S2 [Heart Sounds Gallop] : no gallops [Murmurs] : no murmurs [Heart Sounds Pericardial Friction Rub] : no pericardial rub [Edema] : there was no peripheral edema [Bowel Sounds] : normal bowel sounds [Abdomen Soft] : soft [Abdomen Tenderness] : non-tender [Normal Sphincter Tone] : normal sphincter tone [No Rectal Mass] : no rectal mass [Abnormal Walk] : normal gait [Involuntary Movements] : no involuntary movements were seen [Skin Color & Pigmentation] : normal skin color and pigmentation [Skin Turgor] : normal skin turgor [No Focal Deficits] : no focal deficits [Impaired Insight] : insight and judgment were intact [Oriented To Time, Place, And Person] : oriented to person, place, and time [Affect] : the affect was normal [Mood] : the mood was normal

## 2021-04-06 NOTE — REVIEW OF SYSTEMS
[Feeling Tired] : feeling tired [Lower Ext Edema] : lower extremity edema [As Noted in HPI] : as noted in HPI [Negative] : Heme/Lymph [FreeTextEntry2] : Lost 12-13 pounds over the past 3-4 months.  He is unsure why.  He cut salt out of his diet.  Otherwise, his diet has not changed.  [FreeTextEntry7] : appeitite has been good.  No N/V. Does not like eating without salt.  Believes he eats less food without salt.   [FreeTextEntry8] : nocturia x 1-2,  urinates every 3-4 hours during the days, drinks a lot of water

## 2021-04-13 ENCOUNTER — NON-APPOINTMENT (OUTPATIENT)
Age: 61
End: 2021-04-13

## 2021-04-13 ENCOUNTER — APPOINTMENT (OUTPATIENT)
Dept: CARDIOLOGY | Facility: CLINIC | Age: 61
End: 2021-04-13
Payer: COMMERCIAL

## 2021-04-13 VITALS
BODY MASS INDEX: 21.83 KG/M2 | TEMPERATURE: 98.6 F | DIASTOLIC BLOOD PRESSURE: 70 MMHG | HEIGHT: 65 IN | SYSTOLIC BLOOD PRESSURE: 130 MMHG | HEART RATE: 60 BPM | WEIGHT: 131 LBS

## 2021-04-13 DIAGNOSIS — R73.9 HYPERGLYCEMIA, UNSPECIFIED: ICD-10-CM

## 2021-04-13 PROCEDURE — 99213 OFFICE O/P EST LOW 20 MIN: CPT | Mod: 25

## 2021-04-13 PROCEDURE — 93306 TTE W/DOPPLER COMPLETE: CPT

## 2021-04-13 PROCEDURE — 99072 ADDL SUPL MATRL&STAF TM PHE: CPT

## 2021-04-13 NOTE — PHYSICAL EXAM
[General Appearance - Well Developed] : well developed [Normal Appearance] : normal appearance [Well Groomed] : well groomed [General Appearance - Well Nourished] : well nourished [No Deformities] : no deformities [General Appearance - In No Acute Distress] : no acute distress [Normal Conjunctiva] : the conjunctiva exhibited no abnormalities [Eyelids - No Xanthelasma] : the eyelids demonstrated no xanthelasmas [Normal Oral Mucosa] : normal oral mucosa [No Oral Pallor] : no oral pallor [No Oral Cyanosis] : no oral cyanosis [Normal Jugular Venous A Waves Present] : normal jugular venous A waves present [Normal Jugular Venous V Waves Present] : normal jugular venous V waves present [No Jugular Venous Aranda A Waves] : no jugular venous aranda A waves [Respiration, Rhythm And Depth] : normal respiratory rhythm and effort [Exaggerated Use Of Accessory Muscles For Inspiration] : no accessory muscle use [Auscultation Breath Sounds / Voice Sounds] : lungs were clear to auscultation bilaterally [Heart Rate And Rhythm] : heart rate and rhythm were normal [Heart Sounds] : normal S1 and S2 [Murmurs] : no murmurs present [Abdomen Soft] : soft [Abdomen Tenderness] : non-tender [Abdomen Mass (___ Cm)] : no abdominal mass palpated [Abnormal Walk] : normal gait [Gait - Sufficient For Exercise Testing] : the gait was sufficient for exercise testing [Nail Clubbing] : no clubbing of the fingernails [Cyanosis, Localized] : no localized cyanosis [Petechial Hemorrhages (___cm)] : no petechial hemorrhages [Skin Color & Pigmentation] : normal skin color and pigmentation [] : no rash [No Venous Stasis] : no venous stasis [Skin Lesions] : no skin lesions [No Skin Ulcers] : no skin ulcer [No Xanthoma] : no  xanthoma was observed [Oriented To Time, Place, And Person] : oriented to person, place, and time [Affect] : the affect was normal [Mood] : the mood was normal [No Anxiety] : not feeling anxious

## 2021-04-13 NOTE — DISCUSSION/SUMMARY
[FreeTextEntry1] : The patient returns for followup today including stress echo. His baseline EKG reveals chronically nonspecific ST-T wave abnormalities. There was no clinical or echocardiographic evidence of exercise-induced myocardial ischemia and the patient has excellent exercise capacity. The patient's most recent lab data reveals a BUN of 31 creatinine 2.46. He is being followed closely by a nephrologist Dr. SANTOS.\par Order to maximize his protection against future cardiovascular event recommending that resume statin be increased to 10 mg and that the patient resume baby aspirin 81 mg per we will continue to follow his progress on an annual basis.

## 2021-04-13 NOTE — REASON FOR VISIT
[FreeTextEntry1] : Patient comes for followup including stress echocardiography. He is followed with a number of coronary risk factors especially hypertension and chronic kidney disease. The latter is related to circulatory ureteral reflux that was treated surgically a number of years ago. Renal function has been relatively stable. The patient has had no acute cardiac symptoms.\par \par No symptoms of acute chest pain shortness of breath or palpitations. He is tolerating rosuvastatin 5 mg without difficulty (he had difficulty with atorvastatin)

## 2021-07-22 ENCOUNTER — APPOINTMENT (OUTPATIENT)
Dept: NEPHROLOGY | Facility: CLINIC | Age: 61
End: 2021-07-22
Payer: COMMERCIAL

## 2021-07-22 VITALS
BODY MASS INDEX: 22.66 KG/M2 | TEMPERATURE: 98 F | DIASTOLIC BLOOD PRESSURE: 68 MMHG | WEIGHT: 136 LBS | HEART RATE: 74 BPM | HEIGHT: 65 IN | OXYGEN SATURATION: 99 % | RESPIRATION RATE: 16 BRPM | SYSTOLIC BLOOD PRESSURE: 126 MMHG

## 2021-07-22 VITALS — HEART RATE: 60 BPM | SYSTOLIC BLOOD PRESSURE: 130 MMHG | DIASTOLIC BLOOD PRESSURE: 64 MMHG

## 2021-07-22 PROCEDURE — 99072 ADDL SUPL MATRL&STAF TM PHE: CPT

## 2021-07-22 PROCEDURE — 99214 OFFICE O/P EST MOD 30 MIN: CPT

## 2021-07-22 NOTE — HISTORY OF PRESENT ILLNESS
[FreeTextEntry1] : Ramses Jackson is a 60-year-old, male who has a history of CKD related to urinary reflux.  I last saw him on 04/06/2021.  Prior to that visit he stopped his Micardis and regarding a worsening of the renal function.  I had him stop the Micardis and hydrochlorothiazide for a period of time.  His blood pressure increased.  I put him on metoprolol succinate.  He stopped the metoprolol succinate after his heart rate dropped to 42 bpm. Telmisartan was restarted and amlodipine was added.  Mr. Jackson's blood pressures have been running around 110/58-65.  He feels well.  \par \par

## 2021-07-22 NOTE — ASSESSMENT
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   His recent BUN/creatinine trend is as follows:  31/2.55 (06/10/2019), 29/2.64 (09/12/2019), -/2.7, 33/2.5 (03/12/2020),  31/2.50 (08/28/2020),  40/4.16 (11/24/2020),   Following the labs of 11/24 his ARB and thiazide diuretic were stopped.  He is currently taking the ARB with amlodipine. The repeat BUN/creatinine levels have trended as follows: 32/2.56 (01/04/2020), 262.33 (02/08/2021), 31/2.46 (04/02/2021), 32/2.56 (07/15/2021). The current eGFR is 26 cc per minute.  He is without any nausea or vomiting. His blood pressures has been excellent.  \par \par Other issues include: \par \par (1) Solitary functioning kidney with nephrotic-range proteinuria in the past and a negative workup. \par (2) CKD stage IV  secondary to reflux. No active reflux at his last visit to urology.  \par (3) KYLE has resolved. \par (4) Complex renal cyst. No change in structure on the last imaging study. Low suspicion for a malignancy.\par (5) Hypertension Blood pressure is excellent today.\par (6) Feeling tired. This improved off of the beta blocker. \par \par RECOMMENDATIONS:\par \par (1) Continue telmisartan 80 mg once daily. \par (2) Continue amlodipine 2.5 mg once daily.\par (3) Keep salt out of the diet.  Limit animal protein in the diet. \par (4) Follow the blood pressures at home.\par (5) We talked about home dialysis (PD and HD) during a prior meeting.  I did not readdress it today.\par (6) Mr. Jackson saw [facility] in June 2019.  He reports he has been placed on the waiting list. \par

## 2021-07-22 NOTE — PHYSICAL EXAM
[General Appearance - Alert] : alert [General Appearance - In No Acute Distress] : in no acute distress [Sclera] : the sclera and conjunctiva were normal [Extraocular Movements] : extraocular movements were intact [Neck Appearance] : the appearance of the neck was normal [] : no respiratory distress [Respiration, Rhythm And Depth] : normal respiratory rhythm and effort [Exaggerated Use Of Accessory Muscles For Inspiration] : no accessory muscle use [Auscultation Breath Sounds / Voice Sounds] : lungs were clear to auscultation bilaterally [Heart Rate And Rhythm] : heart rate was normal and rhythm regular [Heart Sounds] : normal S1 and S2 [Heart Sounds Gallop] : no gallops [Murmurs] : no murmurs [Heart Sounds Pericardial Friction Rub] : no pericardial rub [Edema] : there was no peripheral edema [Bowel Sounds] : normal bowel sounds [Abdomen Soft] : soft [Abdomen Tenderness] : non-tender [Normal Sphincter Tone] : normal sphincter tone [No Rectal Mass] : no rectal mass [Abnormal Walk] : normal gait [Involuntary Movements] : no involuntary movements were seen [Skin Color & Pigmentation] : normal skin color and pigmentation [Skin Turgor] : normal skin turgor [No Focal Deficits] : no focal deficits [Oriented To Time, Place, And Person] : oriented to person, place, and time [Impaired Insight] : insight and judgment were intact [Affect] : the affect was normal [Mood] : the mood was normal

## 2021-07-22 NOTE — REVIEW OF SYSTEMS
[Feeling Tired] : feeling tired [As Noted in HPI] : as noted in HPI [Negative] : Heme/Lymph [FreeTextEntry2] : Weight has been stable [FreeTextEntry7] : appeitite has been good.  No N/V.  [FreeTextEntry8] : nocturia x 1,  urinates every 3-4 hours during the days, drinks a lot of water

## 2021-09-28 ENCOUNTER — APPOINTMENT (OUTPATIENT)
Dept: NEPHROLOGY | Facility: CLINIC | Age: 61
End: 2021-09-28
Payer: COMMERCIAL

## 2021-09-28 VITALS
BODY MASS INDEX: 22.16 KG/M2 | TEMPERATURE: 98 F | DIASTOLIC BLOOD PRESSURE: 62 MMHG | SYSTOLIC BLOOD PRESSURE: 118 MMHG | HEART RATE: 57 BPM | WEIGHT: 133 LBS | HEIGHT: 65 IN | OXYGEN SATURATION: 99 % | RESPIRATION RATE: 16 BRPM

## 2021-09-28 PROCEDURE — 99214 OFFICE O/P EST MOD 30 MIN: CPT

## 2021-09-28 NOTE — REASON FOR VISIT
[Follow-Up] : a follow-up visit [Spouse] : spouse [Family Member] : family member [Other: _____] : [unfilled]

## 2021-09-28 NOTE — REVIEW OF SYSTEMS
[Feeling Tired] : feeling tired [As Noted in HPI] : as noted in HPI [Negative] : Heme/Lymph [FreeTextEntry2] : Weight has decreased from 142 to 129.5 pounds over the past 6 months.  Appetite has been "pretty good" [FreeTextEntry7] : appetite has been good.  No N/V.  [FreeTextEntry8] : nocturia x 1-2,  urinates every 3-4 hours during the days, drinks a lot of water

## 2021-09-28 NOTE — HISTORY OF PRESENT ILLNESS
[FreeTextEntry1] : Ramses Jackson is a 61-year-old, male who has a history of CKD related to urinary reflux.  I last saw him on 07/22/2021.   I had him stop the Micardis and hydrochlorothiazide due to worsening renal function late in 2020. His blood pressure increased.  I put him on metoprolol succinate.  He stopped the metoprolol succinate after his heart rate dropped to 42 bpm. Telmisartan was restarted and amlodipine was added.  Mr. Jackson continues to take these.  He started taking metoprolol succinate 12.5 mg in the evenings when his blood pressure increased.  Mr. Jackson's blood pressures have been running around 112-117/65.  His pulse has been around 55-58 with an occasional decrease to 48 bpm.  He feels well.  He developed bilateral swelling under his eyes and went to urgent care around 2 weeks ago.  \par \par Mr. Jackson is currently feeling well.  He denies having any dysphagia or shortness of breath. \par \par

## 2021-09-28 NOTE — ASSESSMENT
[FreeTextEntry1] : Ramses Jackson is a 61-year-old male who has stage IV CKD related to prior urinary reflux and a solitary functioning kidney.   His recent BUN/creatinine trend is as follows:  31/2.55 (06/10/2019), 29/2.64 (09/12/2019), -/2.7, 33/2.5 (03/12/2020),  31/2.50 (08/28/2020),  40/4.16 (11/24/2020).   Following the labs of 11/24 his ARB and thiazide diuretic were stopped.  He is currently taking the ARB with amlodipine and metoprolol succinate.  The repeat BUN/creatinine levels have trended as follows: 32/2.56 (01/04/2020), 262.33 (02/08/2021), 31/2.46 (04/02/2021), 32/2.56 (07/15/2021), 50/2.5 (09/20/2021). The current eGFR is 26.3 cc per minute.  He is without any nausea or vomiting. His blood pressures are excellent.  \par \par Other issues include: \par \par (1) Solitary functioning kidney with nephrotic-range proteinuria in the past and a negative workup. The recent urine protein to creatinine ratio was 0.423 grams per gram (09/20/2020).\par (2) CKD stage IV  secondary to reflux. No active reflux at his last visit to urology.  \par (3)  Complex renal cyst. No change in structure on the last imaging study. Low suspicion for a malignancy.\par (5) Hypertension  Blood pressure is excellent today.\par (6) Feeling tired. This improved off of the beta blocker. It did not return following reinitiation with a low dose beta blocker. \par (7) Hyponatremia.  Suspect this is related to large fluid intake in the presence of CKD. \par \par RECOMMENDATIONS:\par \par (1) Continue telmisartan 80 mg once daily. \par (2) Continue amlodipine 2.5 mg once daily.\par (3) Keep salt out of the diet.  Limit animal protein in the diet. \par (4) I asked Mr. Jackson to cut back on his PO fluids.  He will cut out the water he drinks when he gets up to urinate. \par (4) Follow the blood pressures at home.\par (5) We talked about home dialysis (PD and HD) during a prior meeting. He will watch a youtube video entitled "Failing Kidneys and Different Treatment Options".\par (6) Mr. Jackson saw [facility] in June 2019.  He reports he has been placed on the waiting list. \par

## 2021-10-26 ENCOUNTER — APPOINTMENT (OUTPATIENT)
Dept: NEPHROLOGY | Facility: CLINIC | Age: 61
End: 2021-10-26

## 2021-12-06 ENCOUNTER — APPOINTMENT (OUTPATIENT)
Dept: NEPHROLOGY | Facility: CLINIC | Age: 61
End: 2021-12-06

## 2022-02-17 ENCOUNTER — APPOINTMENT (OUTPATIENT)
Dept: NEPHROLOGY | Facility: CLINIC | Age: 62
End: 2022-02-17
Payer: COMMERCIAL

## 2022-02-17 VITALS
HEIGHT: 65 IN | SYSTOLIC BLOOD PRESSURE: 108 MMHG | DIASTOLIC BLOOD PRESSURE: 56 MMHG | RESPIRATION RATE: 12 BRPM | WEIGHT: 135 LBS | BODY MASS INDEX: 22.49 KG/M2 | HEART RATE: 56 BPM

## 2022-02-17 DIAGNOSIS — E87.1 HYPO-OSMOLALITY AND HYPONATREMIA: ICD-10-CM

## 2022-02-17 PROCEDURE — 99214 OFFICE O/P EST MOD 30 MIN: CPT

## 2022-02-17 RX ORDER — ASPIRIN 325 MG
TABLET ORAL
Refills: 0 | Status: DISCONTINUED | COMMUNITY
End: 2022-02-17

## 2022-02-17 NOTE — REVIEW OF SYSTEMS
[Feeling Tired] : feeling tired [As Noted in HPI] : as noted in HPI [Negative] : Heme/Lymph [FreeTextEntry2] : Weight has decreased from 142 to 129.5 pounds over a 6 month period.  He gained some weight..  His current weight is 135 pounds.  [FreeTextEntry7] : appetite has been good.  No N/V.  [FreeTextEntry8] : nocturia x 1-2,  urinates every 3-4 hours during the days, drinks a lot of water

## 2022-02-17 NOTE — PHYSICAL EXAM
[General Appearance - Alert] : alert [Sclera] : the sclera and conjunctiva were normal [General Appearance - In No Acute Distress] : in no acute distress [Extraocular Movements] : extraocular movements were intact [Neck Appearance] : the appearance of the neck was normal [] : no respiratory distress [Respiration, Rhythm And Depth] : normal respiratory rhythm and effort [Exaggerated Use Of Accessory Muscles For Inspiration] : no accessory muscle use [Auscultation Breath Sounds / Voice Sounds] : lungs were clear to auscultation bilaterally [Heart Rate And Rhythm] : heart rate was normal and rhythm regular [Heart Sounds] : normal S1 and S2 [Heart Sounds Gallop] : no gallops [Murmurs] : no murmurs [Heart Sounds Pericardial Friction Rub] : no pericardial rub [Edema] : there was no peripheral edema [Bowel Sounds] : normal bowel sounds [Abdomen Soft] : soft [Abdomen Tenderness] : non-tender [Normal Sphincter Tone] : normal sphincter tone [No Rectal Mass] : no rectal mass [Abnormal Walk] : normal gait [Involuntary Movements] : no involuntary movements were seen [Skin Color & Pigmentation] : normal skin color and pigmentation [Skin Turgor] : normal skin turgor [No Focal Deficits] : no focal deficits [FreeTextEntry1] : no asterixis or clonus [Oriented To Time, Place, And Person] : oriented to person, place, and time [Impaired Insight] : insight and judgment were intact [Affect] : the affect was normal [Mood] : the mood was normal

## 2022-02-17 NOTE — HISTORY OF PRESENT ILLNESS
[FreeTextEntry1] : Ramses Jackson is a 61-year-old, male who has a history of CKD related to urinary reflux.  I last saw him on 07/22/2021.   I had him stop the Micardis and hydrochlorothiazide due to worsening renal function late in 2020. His blood pressure increased.  I put him on metoprolol succinate.  He stopped the metoprolol succinate after his heart rate dropped to 42 bpm. Telmisartan was restarted and amlodipine was added. \par \par Mr. Jackson is here with his wife.   Mr. Jackson continues to take these.  He started taking metoprolol succinate 12.5 mg in the evenings when his blood pressure increased.  Mr. Jackson's blood pressures have been running around 112-117/65.  His pulse has been around 55-58 with an occasional decrease to 48 bpm.  He feels well.  He developed bilateral swelling under his eyes and went to urgent care around 2 weeks ago.  \par \par Mr. Jackson is here for follow up.  He is accompanied by his wife.  He "is doing good".  His systolic  blood pressures have been running around 128-131 mm Hg. Last night it was 122/65.  His heart rate was 50 bpm.  He states that at times his pulse decreases to 47 bpm.  He does not feel symptoms.  By the end of the day "he is always tired" according his his wife.   His appetite is "pretty good".  \par \par

## 2022-03-17 ENCOUNTER — RX RENEWAL (OUTPATIENT)
Age: 62
End: 2022-03-17

## 2022-04-19 ENCOUNTER — APPOINTMENT (OUTPATIENT)
Dept: CARDIOLOGY | Facility: CLINIC | Age: 62
End: 2022-04-19
Payer: COMMERCIAL

## 2022-04-19 PROCEDURE — 93351 STRESS TTE COMPLETE: CPT

## 2022-04-19 PROCEDURE — 99213 OFFICE O/P EST LOW 20 MIN: CPT

## 2022-04-19 NOTE — DISCUSSION/SUMMARY
[FreeTextEntry1] : Patient's clinical condition is stable.  He did very well on today's stress test.  The patient was able to exercise easily into Av stage IV without difficulty.  The echocardiographic images were normal at rest and vigorous post exercise.  There is no evidence of LV dysfunction or myocardial ischemia.  We have focused on risk factor modification in this case.  I have encouraged the patient to maintain a program of regular exercise and he is also educated on a prudent diet.  The patient is on a vegetarian diet.  I have urged him to avoid excessive simple carbohydrates refined foods and artificial foods.  The patient's antihypertensive medications were recently adjusted by his nephrologist.  In particular myocarditis and hydrochlorothiazide were discontinued and telmisartan and amlodipine were restarted.

## 2022-04-19 NOTE — REASON FOR VISIT
[FreeTextEntry1] : The patient is followed with coronary risk factors especially strong family history of coronary artery disease.  The patient's clinical condition has been stable.  He has been involved in regular exercise with no exertional symptoms.  The patient has been followed by Dr. Earl Hein because of chronic kidney disease which is attributed to ureteral reflux bilaterally which was corrected a number of years ago by Dr. MOSS of urology.

## 2022-08-16 ENCOUNTER — APPOINTMENT (OUTPATIENT)
Dept: NEPHROLOGY | Facility: CLINIC | Age: 62
End: 2022-08-16

## 2022-08-16 VITALS
OXYGEN SATURATION: 98 % | HEIGHT: 65 IN | DIASTOLIC BLOOD PRESSURE: 60 MMHG | WEIGHT: 135 LBS | BODY MASS INDEX: 22.49 KG/M2 | SYSTOLIC BLOOD PRESSURE: 116 MMHG | HEART RATE: 58 BPM | TEMPERATURE: 98 F

## 2022-08-16 PROCEDURE — 99214 OFFICE O/P EST MOD 30 MIN: CPT

## 2022-08-16 RX ORDER — METOPROLOL SUCCINATE 25 MG/1
25 TABLET, EXTENDED RELEASE ORAL DAILY
Qty: 90 | Refills: 2 | Status: DISCONTINUED | COMMUNITY
Start: 2021-01-28 | End: 2022-08-16

## 2022-08-16 NOTE — PHYSICAL EXAM
[General Appearance - Alert] : alert [General Appearance - In No Acute Distress] : in no acute distress [Sclera] : the sclera and conjunctiva were normal [Extraocular Movements] : extraocular movements were intact [Neck Appearance] : the appearance of the neck was normal [] : no respiratory distress [Respiration, Rhythm And Depth] : normal respiratory rhythm and effort [Exaggerated Use Of Accessory Muscles For Inspiration] : no accessory muscle use [Auscultation Breath Sounds / Voice Sounds] : lungs were clear to auscultation bilaterally [Heart Rate And Rhythm] : heart rate was normal and rhythm regular [Heart Sounds] : normal S1 and S2 [Heart Sounds Gallop] : no gallops [Murmurs] : no murmurs [Heart Sounds Pericardial Friction Rub] : no pericardial rub [Edema] : there was no peripheral edema [Bowel Sounds] : normal bowel sounds [Abdomen Soft] : soft [Abdomen Tenderness] : non-tender [Normal Sphincter Tone] : normal sphincter tone [No Rectal Mass] : no rectal mass [Abnormal Walk] : normal gait [Involuntary Movements] : no involuntary movements were seen [Skin Color & Pigmentation] : normal skin color and pigmentation [Skin Turgor] : normal skin turgor [No Focal Deficits] : no focal deficits [FreeTextEntry1] : no asterixis or clonus [Oriented To Time, Place, And Person] : oriented to person, place, and time [Impaired Insight] : insight and judgment were intact [Affect] : the affect was normal [Mood] : the mood was normal

## 2022-08-16 NOTE — ASSESSMENT
[FreeTextEntry1] : Ramses Jackson is a 61-year-old male who has stage IV CKD related to prior urinary reflux and a solitary functioning kidney.  The BUN/creatinine levels have trended as follows: 32/2.56 (01/04/2020), 262.33 (02/08/2021), 31/2.46 (04/02/2021), 32/2.56 (07/15/2021), 50/2.5 (09/20/2021), 41/2.40 (02/14/2022), 37/2.76 (08/12/2022).  The current eGFR is 25 mL per minute.  He is without any nausea or vomiting. His blood pressure is excellent. I am unsure if the warm weather has affected the serum creatinine. \par \par IMPRESSION:\par \par (1) Solitary functioning kidney with nephrotic-range proteinuria in the past with a negative workup. The recent urine protein to creatinine ratio has trended as follows:  0.423 grams (09/20/2020), .409 g/g (02/14/2022).\par (2) CKD stage IV  secondary to reflux. No active reflux at his last visit to urology.  \par (3)  Complex renal cyst. No change in structure on the last imaging study. Low suspicion for a malignancy.\par (4) Hypertension  Excellent control. \par \par RECOMMENDATIONS:\par \par (1) Continue telmisartan 80 mg once daily. \par (2) Taper metoprolol 12.5 mg daily.  Change dosing of metoprolol to every other day x 5 days and then stop.\par (3) If the systolic blood pressure increases to above 130 mm Hg following discontinuation of metoprolol, increase the amlodipine to 5 mg once daily. \par (4) Keep salt out of the diet.  Limit animal protein in the diet. \par (5) kristian the renal transplant lectures (on line) from [facility]\par (6) We talked about home dialysis (PD and HD) during a prior meeting. He will watch a Youtube video entitled "Failing Kidneys and Different Treatment Options".  He preferred the idea of peritoneal dialysis.  He is not ready for access for PD. \par \par [facility] lab studies.  I will call Mr. Jackson with the results.  He will see me in 3 months. \par \par

## 2022-08-16 NOTE — HISTORY OF PRESENT ILLNESS
[FreeTextEntry1] : Ramses Jackson is a 61-year-old, male who has a history of CKD related to urinary reflux.  I last saw him on 07/22/2021.   I had him stop the Micardis and hydrochlorothiazide due to worsening renal function late in 2020. His blood pressure increased.  I put him on metoprolol succinate.  The dose was decreased after his heart rate decreased to 42 bpm.  He continues to take telmisartan and amlodipine.  \par \par Mr. Jackson is here with his wife.  He is returning to [facility].  He is planning on seeing Dr. Baljinder Wallace.  His blood pressures at home are around 105-110/55-62 with a pulse of 48-52. \par \par \par \par

## 2022-08-16 NOTE — REVIEW OF SYSTEMS
[Feeling Tired] : feeling tired [As Noted in HPI] : as noted in HPI [Negative] : Heme/Lymph [FreeTextEntry2] : weight has been stable at 135 pounds, sometimes feels more tired at nights [FreeTextEntry7] : appetite has been good.  No N/V.  [FreeTextEntry8] : nocturia x 1-2,  urinates every 3-4 hours during the days, drinks a lot of water

## 2022-10-19 ENCOUNTER — TRANSCRIPTION ENCOUNTER (OUTPATIENT)
Age: 62
End: 2022-10-19

## 2022-10-20 ENCOUNTER — OUTPATIENT (OUTPATIENT)
Dept: OUTPATIENT SERVICES | Facility: HOSPITAL | Age: 62
LOS: 1 days | Discharge: HOME | End: 2022-10-20

## 2022-10-20 VITALS
TEMPERATURE: 97 F | HEART RATE: 57 BPM | WEIGHT: 134.48 LBS | DIASTOLIC BLOOD PRESSURE: 69 MMHG | SYSTOLIC BLOOD PRESSURE: 154 MMHG | RESPIRATION RATE: 16 BRPM | HEIGHT: 65 IN | OXYGEN SATURATION: 100 %

## 2022-10-20 VITALS
SYSTOLIC BLOOD PRESSURE: 123 MMHG | DIASTOLIC BLOOD PRESSURE: 68 MMHG | TEMPERATURE: 98 F | HEART RATE: 60 BPM | RESPIRATION RATE: 16 BRPM | OXYGEN SATURATION: 99 %

## 2022-10-20 DIAGNOSIS — Z98.890 OTHER SPECIFIED POSTPROCEDURAL STATES: Chronic | ICD-10-CM

## 2022-10-20 DEVICE — BAND CIRCLING 2.5MM: Type: IMPLANTABLE DEVICE | Status: FUNCTIONAL

## 2022-10-20 DEVICE — IMPLANTABLE DEVICE: Type: IMPLANTABLE DEVICE | Status: FUNCTIONAL

## 2022-10-20 RX ORDER — TROPICAMIDE 1 %
1 DROPS OPHTHALMIC (EYE)
Refills: 0 | Status: COMPLETED | OUTPATIENT
Start: 2022-10-20 | End: 2022-10-20

## 2022-10-20 RX ORDER — TELMISARTAN 20 MG/1
1 TABLET ORAL
Qty: 0 | Refills: 0 | DISCHARGE

## 2022-10-20 RX ORDER — LEVOTHYROXINE SODIUM 125 MCG
1 TABLET ORAL
Qty: 0 | Refills: 0 | DISCHARGE

## 2022-10-20 RX ORDER — AMLODIPINE BESYLATE 2.5 MG/1
1 TABLET ORAL
Qty: 0 | Refills: 0 | DISCHARGE

## 2022-10-20 RX ORDER — PREGABALIN 225 MG/1
1 CAPSULE ORAL
Qty: 0 | Refills: 0 | DISCHARGE

## 2022-10-20 RX ORDER — PHENYLEPHRINE HCL 2.5 %
1 DROPS OPHTHALMIC (EYE)
Refills: 0 | Status: COMPLETED | OUTPATIENT
Start: 2022-10-20 | End: 2022-10-20

## 2022-10-20 RX ORDER — CHOLECALCIFEROL (VITAMIN D3) 125 MCG
1 CAPSULE ORAL
Qty: 0 | Refills: 0 | DISCHARGE

## 2022-10-20 RX ORDER — ROSUVASTATIN CALCIUM 5 MG/1
1 TABLET ORAL
Qty: 0 | Refills: 0 | DISCHARGE

## 2022-10-20 RX ORDER — METOPROLOL TARTRATE 50 MG
1 TABLET ORAL
Qty: 0 | Refills: 0 | DISCHARGE

## 2022-10-20 RX ORDER — SODIUM CHLORIDE 9 MG/ML
1000 INJECTION, SOLUTION INTRAVENOUS
Refills: 0 | Status: DISCONTINUED | OUTPATIENT
Start: 2022-10-20 | End: 2022-10-20

## 2022-10-20 RX ORDER — ATROPINE SULFATE 1 %
1 DROPS OPHTHALMIC (EYE)
Refills: 0 | Status: COMPLETED | OUTPATIENT
Start: 2022-10-20 | End: 2022-10-20

## 2022-10-20 RX ORDER — OFLOXACIN 0.3 %
1 DROPS OPHTHALMIC (EYE)
Refills: 0 | Status: DISCONTINUED | OUTPATIENT
Start: 2022-10-20 | End: 2022-10-20

## 2022-10-20 RX ADMIN — Medication 1 DROP(S): at 16:30

## 2022-10-20 RX ADMIN — Medication 1 DROP(S): at 16:41

## 2022-10-20 RX ADMIN — Medication 1 DROP(S): at 16:20

## 2022-10-20 RX ADMIN — Medication 1 DROP(S): at 16:00

## 2022-10-20 RX ADMIN — Medication 1 DROP(S): at 16:31

## 2022-10-20 NOTE — ASU PATIENT PROFILE, ADULT - NSICDXPASTMEDICALHX_GEN_ALL_CORE_FT
PAST MEDICAL HISTORY:  2019 novel coronavirus disease (COVID-19) 4/2022    H/O osteopenia     High blood pressure     High cholesterol     Hypothyroid     Stage 4 chronic kidney disease

## 2022-10-20 NOTE — ASU PATIENT PROFILE, ADULT - NSICDXPASTSURGICALHX_GEN_ALL_CORE_FT
PAST SURGICAL HISTORY:  H/O arthroscopy of shoulder bilateral    H/O colonoscopy     H/O ureter repair 1982    History of arthroscopic knee surgery bilateral

## 2022-10-20 NOTE — PRE-ANESTHESIA EVALUATION ADULT - NSANTHOSAYNRD_GEN_A_CORE
No. NAYLA screening performed.  STOP BANG Legend: 0-2 = LOW Risk; 3-4 = INTERMEDIATE Risk; 5-8 = HIGH Risk

## 2022-10-20 NOTE — PRE-ANESTHESIA EVALUATION ADULT - NSANTHADDINFOFT_GEN_ALL_CORE
For conscious sedation Pt. accepts awareness to sight, sound, touch, pressure and memory of procedure.

## 2022-10-20 NOTE — ASU PATIENT PROFILE, ADULT - MEDICATIONS BROUGHT TO HOSPITAL, PROFILE
Progress Notes by Sofia Garcia MA at 03/08/17 10:27 AM     Author:  Sofia Garcia MA Service:  (none) Author Type:       Filed:  03/08/17 10:27 AM Encounter Date:  3/3/2017 Status:  Signed     :  Sofia Garcia MA (Medical Assistant)            Left message to call back on machine/family member.  Sofia Garcia M.A.[VD1.1T]          Revision History        User Key Date/Time User Provider Type Action    > VD1.1 03/08/17 10:27 AM Sofia Garcia MA Medical Assistant Sign    T - Template             no

## 2022-10-20 NOTE — BRIEF OPERATIVE NOTE - OPERATION/FINDINGS
hazy view due to dense nuclear sclerotic cataract; superior retinal detachment with 2 breaks superotemporally ~10:00. Subretinal fluid drained through drainage sclerotomy superotemporally.

## 2022-11-15 ENCOUNTER — APPOINTMENT (OUTPATIENT)
Dept: NEPHROLOGY | Facility: CLINIC | Age: 62
End: 2022-11-15

## 2022-12-08 PROBLEM — I10 ESSENTIAL (PRIMARY) HYPERTENSION: Chronic | Status: ACTIVE | Noted: 2022-10-20

## 2022-12-08 PROBLEM — E03.9 HYPOTHYROIDISM, UNSPECIFIED: Chronic | Status: ACTIVE | Noted: 2022-10-20

## 2022-12-08 PROBLEM — E78.00 PURE HYPERCHOLESTEROLEMIA, UNSPECIFIED: Chronic | Status: ACTIVE | Noted: 2022-10-20

## 2022-12-08 PROBLEM — U07.1 COVID-19: Chronic | Status: ACTIVE | Noted: 2022-10-20

## 2022-12-08 PROBLEM — Z87.39 PERSONAL HISTORY OF OTHER DISEASES OF THE MUSCULOSKELETAL SYSTEM AND CONNECTIVE TISSUE: Chronic | Status: ACTIVE | Noted: 2022-10-20

## 2022-12-08 PROBLEM — N18.4 CHRONIC KIDNEY DISEASE, STAGE 4 (SEVERE): Chronic | Status: ACTIVE | Noted: 2022-10-20

## 2022-12-22 ENCOUNTER — APPOINTMENT (OUTPATIENT)
Dept: NEPHROLOGY | Facility: CLINIC | Age: 62
End: 2022-12-22

## 2022-12-22 VITALS
HEART RATE: 68 BPM | SYSTOLIC BLOOD PRESSURE: 118 MMHG | DIASTOLIC BLOOD PRESSURE: 62 MMHG | TEMPERATURE: 97.9 F | OXYGEN SATURATION: 98 % | WEIGHT: 136 LBS | BODY MASS INDEX: 22.66 KG/M2 | RESPIRATION RATE: 18 BRPM | HEIGHT: 65 IN

## 2022-12-22 DIAGNOSIS — Z00.00 ENCOUNTER FOR GENERAL ADULT MEDICAL EXAMINATION W/OUT ABNORMAL FINDINGS: ICD-10-CM

## 2022-12-22 PROCEDURE — 99214 OFFICE O/P EST MOD 30 MIN: CPT

## 2022-12-22 NOTE — HISTORY OF PRESENT ILLNESS
[FreeTextEntry1] : Ramses Jackson is a 62-year-old, male who has a history of CKD related to urinary reflux.  I last saw him on 07/22/2021.   I had him stop the Micardis and hydrochlorothiazide due to worsening renal function late in 2020. His blood pressure increased.  I put him on metoprolol succinate.  The dose was decreased after his heart rate decreased to 42 bpm.  He continues to take telmisartan, amlodipine, and metoprolol succinate.\par \par Mr. Jackson is here for follow up.  He underwent surgery for a detached retina. He visited [facility] in August 2022.  He saw Dr. Baljinder Wallace.  \par \par \par \par

## 2022-12-22 NOTE — ASSESSMENT
[FreeTextEntry1] : Ramses Jackson is a 62-year-old male who has stage IV CKD related to prior urinary reflux and a solitary functioning kidney.  The BUN/creatinine levels have trended as follows: 32/2.56 (01/04/2020), 262.33 (02/08/2021), 31/2.46 (04/02/2021), 32/2.56 (07/15/2021), 50/2.5 (09/20/2021), 41/2.40 (02/14/2022), 37/2.76 (08/12/2022), 37/2.48 (12/20/2022). .  The current eGFR is 28 mL per minute.  He is without any nausea or vomiting. His blood pressure is excellent. \par \par IMPRESSION:\par \par (1) Solitary functioning kidney with nephrotic-range proteinuria in the past with a negative workup. The recent urine protein to creatinine ratio has trended as follows:  0.423 grams (09/20/2020), .409 g/g (02/14/2022).\par (2) CKD stage IV  secondary to reflux. No active reflux at his last visit to urology.  \par (3) Complex renal cyst. No change in structure on the last imaging study. Low suspicion for a malignancy.\par (4) Hypertension  Excellent control. \par (5) Controlled serum phosphorous (3.6 mg/dL)\par (6) Controlled intact PTH (24 pg/mL)\par (7) Mild hypercalcemia (10.4 mg/dL) while taking 2000 IU twice daily\par \par \par RECOMMENDATIONS:\par \par (1) Continue telmisartan 80 mg once daily. \par (2) Continue metoprolol succinate\par (3) Continue amlodipine\par (4) Lower vitamin D3 to 2000 IU once daily\par (5) Keep salt out of the diet.  Limit animal protein in the diet. \par (6) Follow the renal transplant lectures (on line) from [facility]\par (7) We talked about home dialysis (PD and HD) during a prior meeting. Ramses watched a Youtube video entitled "Failing Kidneys and Different Treatment Options".  He prefer to start peritoneal dialysis when RRT is required.  It is too early to place a PD catheter. \par \par Return in 3 months with the lab studies below. \par \par

## 2023-02-10 ENCOUNTER — APPOINTMENT (OUTPATIENT)
Dept: CARDIOLOGY | Facility: CLINIC | Age: 63
End: 2023-02-10
Payer: COMMERCIAL

## 2023-02-10 ENCOUNTER — NON-APPOINTMENT (OUTPATIENT)
Age: 63
End: 2023-02-10

## 2023-02-10 VITALS
BODY MASS INDEX: 22.68 KG/M2 | TEMPERATURE: 98.2 F | HEIGHT: 65 IN | WEIGHT: 136.13 LBS | HEART RATE: 60 BPM | SYSTOLIC BLOOD PRESSURE: 128 MMHG | DIASTOLIC BLOOD PRESSURE: 70 MMHG | OXYGEN SATURATION: 98 %

## 2023-02-10 DIAGNOSIS — R07.89 OTHER CHEST PAIN: ICD-10-CM

## 2023-02-10 PROCEDURE — 99214 OFFICE O/P EST MOD 30 MIN: CPT | Mod: 25

## 2023-02-10 PROCEDURE — 93000 ELECTROCARDIOGRAM COMPLETE: CPT

## 2023-02-10 RX ORDER — CHOLECALCIFEROL (VITAMIN D3) 50 MCG
50 MCG TABLET ORAL EVERY MORNING
Refills: 0 | Status: ACTIVE | COMMUNITY

## 2023-02-10 RX ORDER — OMEGA-3-ACID ETHYL ESTERS 1 G/1
1 CAPSULE, LIQUID FILLED ORAL
Qty: 360 | Refills: 3 | Status: ACTIVE | COMMUNITY

## 2023-02-10 NOTE — REVIEW OF SYSTEMS
[Heartburn] : heartburn [Fever] : no fever [Headache] : no headache [Chills] : no chills [Feeling Fatigued] : not feeling fatigued [SOB] : no shortness of breath [Chest Discomfort] : no chest discomfort [Lower Ext Edema] : no extremity edema [Palpitations] : no palpitations [Syncope] : no syncope [Cough] : no cough [Abdominal Pain] : no abdominal pain [Nausea] : no nausea [Vomiting] : no vomiting [Rash] : no rash [Dizziness] : no dizziness [Confusion] : no confusion was observed [Easy Bruising] : no tendency for easy bruising

## 2023-02-10 NOTE — REASON FOR VISIT
[FreeTextEntry1] : Elias Jackson presents with c/o chest burning sensation after having a meal. He took Pepcid and Tums earlier today. Upon arrival to the office, he said that chest burning sensation has resolved with belching and antacids. \par \par He has chronic reflux esophagitis which usually responds to antacids according to patient. He said he was concerned because he is leaving for a 10 day trip to [place] [place] tomorrow. \par \par Mr. Jackson denies chest pain on exertion, SOB, palpitations, dizziness or syncope. He exercises regularly, walked for 20 minutes today without difficulty. He has been eating more beans these past few days.

## 2023-02-10 NOTE — HISTORY OF PRESENT ILLNESS
[FreeTextEntry1] : 62 year old male with hypertension, hyperlipidemia, CKD stage IV, solitary functioning kidney, hyperparathyroidism, hypothyroidism, family history of MI/CAD, chronic ECG changes.

## 2023-02-10 NOTE — DISCUSSION/SUMMARY
[Patient] : the patient [With Me] : with me [___ Week(s)] : in [unfilled] week(s) [EKG obtained to assist in diagnosis and management of assessed problem(s)] : EKG obtained to assist in diagnosis and management of assessed problem(s)

## 2023-02-10 NOTE — CARDIOLOGY SUMMARY
[de-identified] : 2/10/23 Sinus rhythm with nonspecific ST-T abnormality HR 56 [de-identified] : 4/9/22 Stress echo.  Av 8:17 minutes. 10.90 METS. 91% MPHR. NSST abnormality at rest and during exercise. No ischemia.  [de-identified] : 4/9/22 LVEF 65%

## 2023-02-10 NOTE — PHYSICAL EXAM
[No Acute Distress] : no acute distress [Normal S1, S2] : normal S1, S2 [No Murmur] : no murmur [Clear Lung Fields] : clear lung fields [Good Air Entry] : good air entry [No Respiratory Distress] : no respiratory distress  [Soft] : abdomen soft [Non Tender] : non-tender [Normal Bowel Sounds] : normal bowel sounds [Normal Gait] : normal gait [No Edema] : no edema [No Rash] : no rash [Moves all extremities] : moves all extremities [No Focal Deficits] : no focal deficits [Normal Speech] : normal speech [Alert and Oriented] : alert and oriented [Normal memory] : normal memory

## 2023-04-20 ENCOUNTER — RESULT REVIEW (OUTPATIENT)
Age: 63
End: 2023-04-20

## 2023-04-21 ENCOUNTER — NON-APPOINTMENT (OUTPATIENT)
Age: 63
End: 2023-04-21

## 2023-04-25 ENCOUNTER — APPOINTMENT (OUTPATIENT)
Dept: CARDIOLOGY | Facility: CLINIC | Age: 63
End: 2023-04-25

## 2023-06-02 ENCOUNTER — APPOINTMENT (OUTPATIENT)
Dept: HEART AND VASCULAR | Facility: CLINIC | Age: 63
End: 2023-06-02
Payer: COMMERCIAL

## 2023-06-02 PROCEDURE — XXXXX: CPT | Mod: 1L

## 2023-06-06 ENCOUNTER — APPOINTMENT (OUTPATIENT)
Dept: NEPHROLOGY | Facility: CLINIC | Age: 63
End: 2023-06-06
Payer: COMMERCIAL

## 2023-06-06 VITALS
TEMPERATURE: 97.8 F | SYSTOLIC BLOOD PRESSURE: 118 MMHG | HEART RATE: 58 BPM | WEIGHT: 141 LBS | HEIGHT: 65 IN | OXYGEN SATURATION: 99 % | RESPIRATION RATE: 16 BRPM | BODY MASS INDEX: 23.49 KG/M2 | DIASTOLIC BLOOD PRESSURE: 62 MMHG

## 2023-06-06 DIAGNOSIS — N18.9 CHRONIC KIDNEY DISEASE, UNSPECIFIED: ICD-10-CM

## 2023-06-06 DIAGNOSIS — N52.9 MALE ERECTILE DYSFUNCTION, UNSPECIFIED: ICD-10-CM

## 2023-06-06 DIAGNOSIS — D63.1 CHRONIC KIDNEY DISEASE, UNSPECIFIED: ICD-10-CM

## 2023-06-06 PROCEDURE — 99214 OFFICE O/P EST MOD 30 MIN: CPT

## 2023-06-06 RX ORDER — ASPIRIN 81 MG/1
81 TABLET, DELAYED RELEASE ORAL
Refills: 0 | Status: DISCONTINUED | COMMUNITY
Start: 2023-02-10 | End: 2023-06-06

## 2023-06-06 NOTE — REVIEW OF SYSTEMS
[Feeling Tired] : feeling tired [As Noted in HPI] : as noted in HPI [Negative] : Heme/Lymph [FreeTextEntry2] : weight has been stable. [FreeTextEntry7] : appetite has been good.  No N/V.  [FreeTextEntry8] : nocturia x 1-2,  urinates every 3-4 hours during the days, drinks a lot of water

## 2023-06-06 NOTE — HISTORY OF PRESENT ILLNESS
[FreeTextEntry1] : Ramses Jackson is a 62-year-old, male who has a history of CKD related to urinary reflux.  I last saw him on 07/22/2021.   I had him stop the Micardis and hydrochlorothiazide due to worsening renal function late in 2020. His blood pressure increased.  I put him on metoprolol succinate.  The dose was decreased after his heart rate decreased to 42 bpm.  He continues to take telmisartan, amlodipine, and metoprolol succinate.\par \par Mr. Jackson is here for follow up.  He is accompanied by his wife. He visited [facility] in August 2022.  He saw Dr. Baljinder Wallace.  He has not returned for follow up at this time.  They will see him when the eGFR is below 20 mL per minute.  Overall, he has been doing well.  He retired in December 2022. \par \par \par \par

## 2023-06-06 NOTE — PHYSICAL EXAM
[General Appearance - Alert] : alert [General Appearance - In No Acute Distress] : in no acute distress [Sclera] : the sclera and conjunctiva were normal [Extraocular Movements] : extraocular movements were intact [Neck Appearance] : the appearance of the neck was normal [] : no respiratory distress [Respiration, Rhythm And Depth] : normal respiratory rhythm and effort [Exaggerated Use Of Accessory Muscles For Inspiration] : no accessory muscle use [Auscultation Breath Sounds / Voice Sounds] : lungs were clear to auscultation bilaterally [Heart Sounds] : normal S1 and S2 [Heart Sounds Gallop] : no gallops [Murmurs] : no murmurs [Heart Sounds Pericardial Friction Rub] : no pericardial rub [Edema] : there was no peripheral edema [Bowel Sounds] : normal bowel sounds [Abdomen Soft] : soft [Abdomen Tenderness] : non-tender [Normal Sphincter Tone] : normal sphincter tone [No Rectal Mass] : no rectal mass [Abnormal Walk] : normal gait [Involuntary Movements] : no involuntary movements were seen [Skin Color & Pigmentation] : normal skin color and pigmentation [Skin Turgor] : normal skin turgor [No Focal Deficits] : no focal deficits [FreeTextEntry1] : no asterixis or clonus [Oriented To Time, Place, And Person] : oriented to person, place, and time [Impaired Insight] : insight and judgment were intact [Affect] : the affect was normal [Mood] : the mood was normal

## 2023-06-06 NOTE — ASSESSMENT
[FreeTextEntry1] : Ramses Jackson is a 62-year-old male who has stage IV CKD related to prior urinary reflux and a solitary functioning kidney.  The BUN/creatinine levels have trended as follows: 32/2.56 (01/04/2020), 262.33 (02/08/2021), 31/2.46 (04/02/2021), 32/2.56 (07/15/2021), 50/2.5 (09/20/2021), 41/2.40 (02/14/2022), 37/2.76 (08/12/2022), 37/2.48 (12/20/2022), 34/2.54 (06/02/2023)..  The current eGFR is 27 mL per minute.  He is without any nausea or vomiting. His blood pressure is excellent. Urine protein to creatinine ratio has been under 1/2 gram per gram since September 2020.  It was not repeated on this set of tests.\par \par The iPTH is 105 pg per mL with a 25 hydroxy vitamin D level of 72.1 ng per mL (normal is 30-80).  The serum potassium is 5.6 meq per liter.  The iron stores are adequate. The serum phosphorous is low at 2.2 mg per dL.  Mr. Jackson takes calcium tablets with his meals. \par \par IMPRESSION:\par \par (1) Solitary functioning kidney with nephrotic-range proteinuria in the past with a negative workup. \par (2) CKD stage IV  secondary to reflux. No active reflux at his last visit to urology.  \par (3) Complex renal cyst. No change in structure on the last imaging study. Low suspicion for a malignancy.\par (4) Hypertension  Excellent control. \par (5) Low  serum phosphorous (2.2 mg/dL)\par (6) Elevated intact  pg/mL)\par (7) Mild hypercalcemia resolved (8.4 mg/dL).  It was elevated while taking 2000 IU twice daily\par \par \par RECOMMENDATIONS:\par \par (1) Continue telmisartan 80 mg once daily. \par (2) Continue metoprolol succinate\par (3) Continue amlodipine\par (4) Lower vitamin D3 to 1000 IU daily.  Add calcitriol 0.25 mcg PO thrice weekly\par (5) Okay to take oral calcium though NOT with meals.  This should improve the serum phosphorous.\par (6) Keep salt out of the diet.  Limit animal protein in the diet. \par (7) Follow the renal transplant on-line group at  [facility]\par (8) Change dosing of metoprolol succinate from 12.5 mg daily to 12.5 mg every 48 hours due to occasional symptomatic bradycardia. \par (9) We talked about home dialysis (PD and HD) during a prior meeting. Ramses watched a Youtube video entitled "Failing Kidneys and Different Treatment Options".  He prefers to start peritoneal dialysis when RRT is required.  It is too early to place a PD catheter. \par \par Return in 3 months with the lab studies below. \par \par

## 2023-06-08 DIAGNOSIS — E83.39 OTHER DISORDERS OF PHOSPHORUS METABOLISM: ICD-10-CM

## 2023-06-08 LAB
25(OH)D3 SERPL-MCNC: 72.1 NG/ML
ALBUMIN SERPL ELPH-MCNC: 4.1 G/DL
ALP BLD-CCNC: 60 U/L
ALT SERPL-CCNC: 11 U/L
ANION GAP SERPL CALC-SCNC: 11 MMOL/L
AST SERPL-CCNC: 17 U/L
BILIRUB SERPL-MCNC: 0.2 MG/DL
BUN SERPL-MCNC: 34 MG/DL
CALCIUM SERPL-MCNC: 8.4 MG/DL
CALCIUM SERPL-MCNC: 8.4 MG/DL
CHLORIDE SERPL-SCNC: 101 MMOL/L
CHOLEST SERPL-MCNC: 109 MG/DL
CO2 SERPL-SCNC: 23 MMOL/L
CREAT SERPL-MCNC: 2.54 MG/DL
EGFR: 27 ML/MIN/1.73M2
ESTIMATED AVERAGE GLUCOSE: 117 MG/DL
FERRITIN SERPL-MCNC: 87 NG/ML
GLUCOSE SERPL-MCNC: 91 MG/DL
HBA1C MFR BLD HPLC: 5.7 %
HDLC SERPL-MCNC: 39 MG/DL
IRON SATN MFR SERPL: 23 %
IRON SERPL-MCNC: 63 UG/DL
LDLC SERPL CALC-MCNC: 44 MG/DL
NONHDLC SERPL-MCNC: 69 MG/DL
PARATHYROID HORMONE INTACT: 105 PG/ML
PHOSPHATE SERPL-MCNC: 2.2 MG/DL
POTASSIUM SERPL-SCNC: 5.6 MMOL/L
PROT SERPL-MCNC: 6.6 G/DL
SODIUM SERPL-SCNC: 135 MMOL/L
TIBC SERPL-MCNC: 270 UG/DL
TRIGL SERPL-MCNC: 127 MG/DL
UIBC SERPL-MCNC: 207 UG/DL

## 2023-07-13 ENCOUNTER — APPOINTMENT (OUTPATIENT)
Dept: HEART AND VASCULAR | Facility: CLINIC | Age: 63
End: 2023-07-13
Payer: COMMERCIAL

## 2023-07-13 PROCEDURE — XXXXX: CPT | Mod: 1L

## 2023-07-14 ENCOUNTER — TRANSCRIPTION ENCOUNTER (OUTPATIENT)
Age: 63
End: 2023-07-14

## 2023-07-14 LAB
25(OH)D3 SERPL-MCNC: 66.4 NG/ML
25(OH)D3 SERPL-MCNC: 69 NG/ML
ALBUMIN SERPL ELPH-MCNC: 4.7 G/DL
ALP BLD-CCNC: 65 U/L
ALT SERPL-CCNC: 14 U/L
ANION GAP SERPL CALC-SCNC: 14 MMOL/L
APPEARANCE: CLEAR
AST SERPL-CCNC: 22 U/L
BACTERIA: NEGATIVE /HPF
BILIRUB SERPL-MCNC: 0.5 MG/DL
BILIRUBIN URINE: NEGATIVE
BLOOD URINE: NEGATIVE
BUN SERPL-MCNC: 32 MG/DL
CALCIUM SERPL-MCNC: 10.4 MG/DL
CALCIUM SERPL-MCNC: 10.4 MG/DL
CAST: 0 /LPF
CHLORIDE ?TM UR-SCNC: <20 MMOL/L
CHLORIDE SERPL-SCNC: 101 MMOL/L
CHOLEST SERPL-MCNC: 146 MG/DL
CO2 SERPL-SCNC: 26 MMOL/L
COLOR: YELLOW
CREAT SERPL-MCNC: 2.92 MG/DL
CREAT SPEC-SCNC: 40 MG/DL
CREAT/PROT UR: 0.8 RATIO
EGFR: 23 ML/MIN/1.73M2
EPITHELIAL CELLS: 0 /HPF
FERRITIN SERPL-MCNC: 82 NG/ML
GLUCOSE QUALITATIVE U: NEGATIVE MG/DL
GLUCOSE SERPL-MCNC: 96 MG/DL
HDLC SERPL-MCNC: 65 MG/DL
IRON SATN MFR SERPL: 37 %
IRON SERPL-MCNC: 132 UG/DL
KETONES URINE: NEGATIVE MG/DL
LDLC SERPL CALC-MCNC: 60 MG/DL
LEUKOCYTE ESTERASE URINE: NEGATIVE
MICROSCOPIC-UA: NORMAL
NITRITE URINE: NEGATIVE
NONHDLC SERPL-MCNC: 80 MG/DL
PARATHYROID HORMONE INTACT: 35 PG/ML
PH URINE: 6
PHOSPHATE SERPL-MCNC: 3.3 MG/DL
POTASSIUM SERPL-SCNC: 5.8 MMOL/L
POTASSIUM UR-SCNC: 13.6 MMOL/L
PROT SERPL-MCNC: 7.4 G/DL
PROT UR-MCNC: 33 MG/DL
PROTEIN URINE: 30 MG/DL
RED BLOOD CELLS URINE: 0 /HPF
SODIUM ?TM SUB UR QN: <20 MMOL/L
SODIUM SERPL-SCNC: 140 MMOL/L
SPECIFIC GRAVITY URINE: 1.01
TIBC SERPL-MCNC: 354 UG/DL
TRIGL SERPL-MCNC: 115 MG/DL
UIBC SERPL-MCNC: 222 UG/DL
URATE SERPL-MCNC: 7.9 MG/DL
UROBILINOGEN URINE: 0.2 MG/DL
WHITE BLOOD CELLS URINE: 0 /HPF

## 2023-07-17 ENCOUNTER — NON-APPOINTMENT (OUTPATIENT)
Age: 63
End: 2023-07-17

## 2023-07-18 ENCOUNTER — NON-APPOINTMENT (OUTPATIENT)
Age: 63
End: 2023-07-18

## 2023-07-21 RX ORDER — TELMISARTAN 80 MG/1
80 TABLET ORAL
Qty: 90 | Refills: 3 | Status: DISCONTINUED | COMMUNITY
End: 2023-07-21

## 2023-08-01 ENCOUNTER — APPOINTMENT (OUTPATIENT)
Dept: HEART AND VASCULAR | Facility: CLINIC | Age: 63
End: 2023-08-01
Payer: COMMERCIAL

## 2023-08-01 PROCEDURE — XXXXX: CPT | Mod: 1L

## 2023-08-03 LAB
ANION GAP SERPL CALC-SCNC: 14 MMOL/L
BUN SERPL-MCNC: 25 MG/DL
CALCIUM SERPL-MCNC: 10.3 MG/DL
CHLORIDE SERPL-SCNC: 99 MMOL/L
CO2 SERPL-SCNC: 28 MMOL/L
CREAT SERPL-MCNC: 2.97 MG/DL
EGFR: 23 ML/MIN/1.73M2
GLUCOSE SERPL-MCNC: 146 MG/DL
POTASSIUM SERPL-SCNC: 5.2 MMOL/L
SODIUM SERPL-SCNC: 141 MMOL/L

## 2023-08-30 ENCOUNTER — APPOINTMENT (OUTPATIENT)
Dept: HEART AND VASCULAR | Facility: CLINIC | Age: 63
End: 2023-08-30
Payer: COMMERCIAL

## 2023-08-30 PROCEDURE — XXXXX: CPT | Mod: 1L

## 2023-09-05 ENCOUNTER — NON-APPOINTMENT (OUTPATIENT)
Age: 63
End: 2023-09-05

## 2023-09-05 LAB
25(OH)D3 SERPL-MCNC: 50.3 NG/ML
ALBUMIN SERPL ELPH-MCNC: 4.2 G/DL
ALP BLD-CCNC: 53 U/L
ALT SERPL-CCNC: 14 U/L
ANION GAP SERPL CALC-SCNC: 14 MMOL/L
AST SERPL-CCNC: 23 U/L
BILIRUB SERPL-MCNC: 0.3 MG/DL
BUN SERPL-MCNC: 32 MG/DL
CALCIUM SERPL-MCNC: 9.5 MG/DL
CALCIUM SERPL-MCNC: 9.5 MG/DL
CHLORIDE SERPL-SCNC: 97 MMOL/L
CO2 SERPL-SCNC: 24 MMOL/L
CREAT SERPL-MCNC: 2.81 MG/DL
CREAT SPEC-SCNC: 28 MG/DL
CREAT/PROT UR: 1 RATIO
EGFR: 24 ML/MIN/1.73M2
FERRITIN SERPL-MCNC: 57 NG/ML
GLUCOSE SERPL-MCNC: 105 MG/DL
IRON SATN MFR SERPL: 35 %
IRON SERPL-MCNC: 105 UG/DL
PARATHYROID HORMONE INTACT: 37 PG/ML
PHOSPHATE SERPL-MCNC: 2.9 MG/DL
POTASSIUM SERPL-SCNC: 3.8 MMOL/L
PROT SERPL-MCNC: 6.9 G/DL
PROT UR-MCNC: 27 MG/DL
SODIUM SERPL-SCNC: 136 MMOL/L
TIBC SERPL-MCNC: 304 UG/DL
UIBC SERPL-MCNC: 199 UG/DL
URATE SERPL-MCNC: 7.1 MG/DL

## 2023-09-26 RX ORDER — METOPROLOL SUCCINATE 25 MG/1
25 TABLET, EXTENDED RELEASE ORAL
Qty: 45 | Refills: 1 | Status: DISCONTINUED | COMMUNITY
Start: 1900-01-01 | End: 2023-09-26

## 2023-10-09 ENCOUNTER — APPOINTMENT (OUTPATIENT)
Dept: NEPHROLOGY | Facility: CLINIC | Age: 63
End: 2023-10-09

## 2023-10-30 ENCOUNTER — APPOINTMENT (OUTPATIENT)
Dept: HEART AND VASCULAR | Facility: CLINIC | Age: 63
End: 2023-10-30
Payer: COMMERCIAL

## 2023-10-30 PROCEDURE — XXXXX: CPT | Mod: 1L

## 2023-10-31 ENCOUNTER — APPOINTMENT (OUTPATIENT)
Dept: NEPHROLOGY | Facility: CLINIC | Age: 63
End: 2023-10-31
Payer: MEDICARE

## 2023-10-31 VITALS
BODY MASS INDEX: 23.16 KG/M2 | SYSTOLIC BLOOD PRESSURE: 122 MMHG | RESPIRATION RATE: 18 BRPM | DIASTOLIC BLOOD PRESSURE: 68 MMHG | TEMPERATURE: 97.8 F | OXYGEN SATURATION: 99 % | WEIGHT: 139 LBS | HEIGHT: 65 IN | HEART RATE: 58 BPM

## 2023-10-31 PROCEDURE — 99214 OFFICE O/P EST MOD 30 MIN: CPT

## 2023-10-31 RX ORDER — CALCITRIOL 0.25 UG/1
0.25 CAPSULE, LIQUID FILLED ORAL
Qty: 39 | Refills: 1 | Status: DISCONTINUED | COMMUNITY
Start: 2023-06-29 | End: 2023-10-31

## 2023-10-31 RX ORDER — SILDENAFIL 25 MG/1
25 TABLET ORAL
Qty: 6 | Refills: 1 | Status: DISCONTINUED | COMMUNITY
Start: 2023-06-06 | End: 2023-10-31

## 2023-11-10 LAB
ANION GAP SERPL CALC-SCNC: 12 MMOL/L
BUN SERPL-MCNC: 35 MG/DL
CALCIUM SERPL-MCNC: 9.9 MG/DL
CHLORIDE SERPL-SCNC: 99 MMOL/L
CO2 SERPL-SCNC: 26 MMOL/L
CREAT SERPL-MCNC: 2.87 MG/DL
EGFR: 24 ML/MIN/1.73M2
GLUCOSE SERPL-MCNC: 148 MG/DL
POTASSIUM SERPL-SCNC: 4.1 MMOL/L
SODIUM SERPL-SCNC: 137 MMOL/L

## 2023-12-06 ENCOUNTER — APPOINTMENT (OUTPATIENT)
Dept: HEART AND VASCULAR | Facility: CLINIC | Age: 63
End: 2023-12-06
Payer: MEDICARE

## 2023-12-06 PROCEDURE — 36415 COLL VENOUS BLD VENIPUNCTURE: CPT

## 2023-12-18 LAB
25(OH)D3 SERPL-MCNC: 52.9 NG/ML
ALBUMIN SERPL ELPH-MCNC: 4.3 G/DL
ALP BLD-CCNC: 57 U/L
ALT SERPL-CCNC: 16 U/L
ANION GAP SERPL CALC-SCNC: 13 MMOL/L
APPEARANCE: CLEAR
AST SERPL-CCNC: 27 U/L
BACTERIA: NEGATIVE /HPF
BASOPHILS # BLD AUTO: 0.03 K/UL
BASOPHILS NFR BLD AUTO: 0.8 %
BILIRUB SERPL-MCNC: 0.6 MG/DL
BILIRUBIN URINE: NEGATIVE
BLOOD URINE: NEGATIVE
BUN SERPL-MCNC: 44 MG/DL
CALCIUM SERPL-MCNC: 9.9 MG/DL
CALCIUM SERPL-MCNC: 9.9 MG/DL
CAST: 0 /LPF
CHLORIDE SERPL-SCNC: 96 MMOL/L
CHOLEST SERPL-MCNC: 163 MG/DL
CO2 SERPL-SCNC: 26 MMOL/L
COLOR: YELLOW
CREAT SERPL-MCNC: 3.13 MG/DL
CREAT SPEC-SCNC: 48 MG/DL
CREAT/PROT UR: 1.6 RATIO
EGFR: 21 ML/MIN/1.73M2
EOSINOPHIL # BLD AUTO: 0.04 K/UL
EOSINOPHIL NFR BLD AUTO: 1 %
EPITHELIAL CELLS: 0 /HPF
FERRITIN SERPL-MCNC: 66 NG/ML
GLUCOSE QUALITATIVE U: NEGATIVE MG/DL
GLUCOSE SERPL-MCNC: 85 MG/DL
HCT VFR BLD CALC: 44.8 %
HDLC SERPL-MCNC: 72 MG/DL
HGB BLD-MCNC: 14.5 G/DL
IMM GRANULOCYTES NFR BLD AUTO: 0.3 %
IRON SATN MFR SERPL: 39 %
IRON SERPL-MCNC: 121 UG/DL
KETONES URINE: NEGATIVE MG/DL
LDLC SERPL CALC-MCNC: 74 MG/DL
LEUKOCYTE ESTERASE URINE: NEGATIVE
LYMPHOCYTES # BLD AUTO: 1.12 K/UL
LYMPHOCYTES NFR BLD AUTO: 28 %
MAN DIFF?: NORMAL
MCHC RBC-ENTMCNC: 29 PG
MCHC RBC-ENTMCNC: 32.4 GM/DL
MCV RBC AUTO: 89.6 FL
MICROSCOPIC-UA: NORMAL
MONOCYTES # BLD AUTO: 0.58 K/UL
MONOCYTES NFR BLD AUTO: 14.5 %
NEUTROPHILS # BLD AUTO: 2.22 K/UL
NEUTROPHILS NFR BLD AUTO: 55.4 %
NITRITE URINE: NEGATIVE
NONHDLC SERPL-MCNC: 91 MG/DL
PARATHYROID HORMONE INTACT: 60 PG/ML
PH URINE: 6.5
PHOSPHATE SERPL-MCNC: 3.5 MG/DL
PLATELET # BLD AUTO: 186 K/UL
POTASSIUM SERPL-SCNC: 4.6 MMOL/L
PROT SERPL-MCNC: 7.2 G/DL
PROT UR-MCNC: 78 MG/DL
PROTEIN URINE: 100 MG/DL
RBC # BLD: 5 M/UL
RBC # FLD: 12.7 %
RED BLOOD CELLS URINE: 0 /HPF
SODIUM SERPL-SCNC: 135 MMOL/L
SPECIFIC GRAVITY URINE: 1.01
TIBC SERPL-MCNC: 309 UG/DL
TRIGL SERPL-MCNC: 95 MG/DL
UIBC SERPL-MCNC: 187 UG/DL
UROBILINOGEN URINE: 0.2 MG/DL
WBC # FLD AUTO: 4 K/UL
WHITE BLOOD CELLS URINE: 0 /HPF

## 2024-01-08 ENCOUNTER — APPOINTMENT (OUTPATIENT)
Dept: NEPHROLOGY | Facility: CLINIC | Age: 64
End: 2024-01-08
Payer: MEDICARE

## 2024-01-08 VITALS
BODY MASS INDEX: 22.99 KG/M2 | WEIGHT: 138 LBS | TEMPERATURE: 97.8 F | HEIGHT: 65 IN | SYSTOLIC BLOOD PRESSURE: 122 MMHG | HEART RATE: 53 BPM | DIASTOLIC BLOOD PRESSURE: 66 MMHG | OXYGEN SATURATION: 98 % | RESPIRATION RATE: 18 BRPM

## 2024-01-08 DIAGNOSIS — R79.89 OTHER SPECIFIED ABNORMAL FINDINGS OF BLOOD CHEMISTRY: ICD-10-CM

## 2024-01-08 PROCEDURE — 99214 OFFICE O/P EST MOD 30 MIN: CPT

## 2024-01-08 RX ORDER — LEVOTHYROXINE SODIUM 100 UG/1
100 TABLET ORAL
Refills: 0 | Status: ACTIVE | COMMUNITY

## 2024-01-08 RX ORDER — LEVOTHYROXINE SODIUM 88 UG/1
88 TABLET ORAL
Refills: 0 | Status: ACTIVE | COMMUNITY

## 2024-01-08 RX ORDER — HYDROCHLOROTHIAZIDE 12.5 MG/1
12.5 TABLET ORAL DAILY
Qty: 90 | Refills: 2 | Status: DISCONTINUED | COMMUNITY
Start: 2023-11-17 | End: 2024-01-08

## 2024-01-08 NOTE — ASSESSMENT
[FreeTextEntry1] : Ramses Jackson is a 63-year-old male who has stage IV CKD related to prior urinary reflux and a solitary functioning kidney. The BUN/creatinine levels have trended as follows: 32/2.56 (01/04/2020), 262.33 (02/08/2021), 31/2.46 (04/02/2021), 32/2.56 (07/15/2021), 50/2.5 (09/20/2021), 41/2.40 (02/14/2022), 37/2.76 (08/12/2022), 37/2.48 (12/20/2022), 34/2.54 (06/02/2023), 32/2.81 (08/31/2023), 35/2.87 (10/31/2023), 44/3.13 (12/06/2023), 39/3.2 (12/22/2023). The current eGFR is 20.7 mL per minute.  He is without any nausea or vomiting. His blood pressure is excellent. Urine protein to creatinine ratio was 1.6 grams per gram in December 2023.  IMPRESSION:  (1) Solitary functioning kidney with prior nephrotic-range proteinuria in the past with a negative serologic workup.  (2) CKD stage IV secondary to reflux. No active reflux at his last visit to urology.  (3) Complex renal cyst. No change in structure on the last imaging study. Low suspicion for a malignancy.  (4) Hypertension Excellent control.  (6) Elevated intact  pg/mL improved to 60 pg/mL on vitamin D3.   (7) Mild hypercalcemia resolved (8.4 mg/dL). It was elevated while taking vitamin d 2000 IU twice daily   RECOMMENDATIONS:  (1) Continue amlodipine. (2) Stay off of metoprolol due to bradycardia. (3) Stay off of telmisartan due to hyperkalemia. (4) Continue oral vitamin D3.  (5) Keep salt out of the diet. Limit animal protein in the diet. (6) Follow the renal transplant at [facility]  (7) We talked about home dialysis (PD and HD) during a prior meeting. Ramses watched a Youtube video entitled "Failing Kidneys and Different Treatment Options". He prefers to start peritoneal dialysis when RRT is required.  Given that Mr. Jackson has a possible kidney donor and is asymptomatic, will not have access placed at this time.  Return in 3 months with the lab studies below.

## 2024-01-08 NOTE — PHYSICAL EXAM
[General Appearance - Alert] : alert [General Appearance - In No Acute Distress] : in no acute distress [Sclera] : the sclera and conjunctiva were normal [Extraocular Movements] : extraocular movements were intact [Neck Appearance] : the appearance of the neck was normal [] : no respiratory distress [Respiration, Rhythm And Depth] : normal respiratory rhythm and effort [Exaggerated Use Of Accessory Muscles For Inspiration] : no accessory muscle use [Auscultation Breath Sounds / Voice Sounds] : lungs were clear to auscultation bilaterally [Heart Rate And Rhythm] : heart rate was normal and rhythm regular [Heart Sounds] : normal S1 and S2 [Heart Sounds Gallop] : no gallops [Murmurs] : no murmurs [Heart Sounds Pericardial Friction Rub] : no pericardial rub [Edema] : there was no peripheral edema [Bowel Sounds] : normal bowel sounds [Abdomen Soft] : soft [Abdomen Tenderness] : non-tender [Abnormal Walk] : normal gait [Involuntary Movements] : no involuntary movements were seen [Skin Turgor] : normal skin turgor [Skin Color & Pigmentation] : normal skin color and pigmentation [No Focal Deficits] : no focal deficits [FreeTextEntry1] : no asterixis or clonus [Oriented To Time, Place, And Person] : oriented to person, place, and time [Impaired Insight] : insight and judgment were intact [Affect] : the affect was normal [Mood] : the mood was normal

## 2024-01-08 NOTE — HISTORY OF PRESENT ILLNESS
[FreeTextEntry1] : Ramses Jackson is a 63-year-old, male who has a history of CKD related to urinary reflux.  I last saw him on 07/22/2021.   I had him stop the Micardis and hydrochlorothiazide due to worsening renal function late in 2020. His blood pressure increased.  I put him on metoprolol succinate.  The dose was decreased after his heart rate decreased to 42 bpm.    Mr. Jackson is here for follow up.  He is accompanied by his wife. He recently returned from a  tour.  He visited [facility] in August 2022.  He saw Dr. Baljinder Wallace.  He returned again last week.   He has a meeting with a transplant coordinator tomorrow. Overall, he has been doing well.  He retired in December 2022.   Mr. Jackson is currently taking amlodipine 5 mg twice daily.  His blood pressures have been good at home, though was elevated when he last visited [facility].

## 2024-01-11 ENCOUNTER — NON-APPOINTMENT (OUTPATIENT)
Age: 64
End: 2024-01-11

## 2024-02-01 ENCOUNTER — APPOINTMENT (OUTPATIENT)
Dept: PULMONOLOGY | Facility: CLINIC | Age: 64
End: 2024-02-01
Payer: MEDICARE

## 2024-02-01 VITALS
DIASTOLIC BLOOD PRESSURE: 71 MMHG | HEIGHT: 65 IN | WEIGHT: 138 LBS | OXYGEN SATURATION: 100 % | SYSTOLIC BLOOD PRESSURE: 153 MMHG | HEART RATE: 76 BPM | BODY MASS INDEX: 22.99 KG/M2

## 2024-02-01 DIAGNOSIS — R91.1 SOLITARY PULMONARY NODULE: ICD-10-CM

## 2024-02-01 PROCEDURE — 99204 OFFICE O/P NEW MOD 45 MIN: CPT

## 2024-02-01 NOTE — HISTORY OF PRESENT ILLNESS
[Never] : never [Fatigue] : fatigue [Nonrestorative Sleep] : nonrestorative sleep [Snoring] : snoring [TextBox_4] : 63 yr old male with PMH of chronic kidney disease stage IV (looking to do kidney transplant) HTN, HLD.  Presents for further Evalutation for fatigue.  He had an exam with his PCP for fatigue went for CXR.  From there was sent to pulmonary Dr. Minor and had a CT scan. Denies chest pain, SOB, fever, coughing.  He joined the gym 2 - 3 times a week Elliptical / Bike for 30 mins.  He was told he snores at night but denies witnesses apnea.   Brother with idiopathic fibrosis.   [Unintentional Sleep while Active] : no unintentional sleep while active [Witnessed Apneas] : no witnessed apneas [ESS] : 0

## 2024-02-01 NOTE — ASSESSMENT
[FreeTextEntry1] : #Abormal CT scan #Pulmoanry Nodules  #Pulmoanry Clearance for Kidney transplant  Reviewed the images of the CT scan from 1/28/2024 there is a 4 mm nodule right fissure.  Minimal linear atelectasis/scarring in a the right middle lobe. Not consistent with fibrosis or interstitial abnormalities.  Pt is asymptomatic.  Non smoker and no family history of lung CA. Discussed the causes for IPF.  Reviewed PFT and 6 min walk normal studies.   SHERYL VASQUEZ  is optimized for surgery. He is to be extubated once fully awake and able to protect airway.  The patient is to be monitored in the recovery room. They might benefit for high flow oxygen or noninvasive ventilation to prevent or reverse atelectasis.   Avoid oversedation.  DVT prophylaxis early mobilization is recommended.   Recommend follow up with repeat CT scan in a year and PFT.

## 2024-02-01 NOTE — END OF VISIT
[Time Spent: ___ minutes] : I have spent [unfilled] minutes of time on the encounter. [>50% of the face to face encounter time was spent on counseling and/or coordination of care for ___] : Greater than 50% of the face to face encounter time was spent on counseling and/or coordination of care for [unfilled] [FreeTextEntry3] : Pt seen with SANTI Lala and agree on the above plan of care.     The patient has no occupational exposure.  NO evidence of ILD nor DOROTEO on the CT scan.  His brother  of IPF but he had no acute patient exposure.  The PFT was normal and the 6-minute walk test.  The 4 mm not nodule is optional to follow-up in 1 year and we will repeat the CAT scan and PFT in 1 year

## 2024-02-16 ENCOUNTER — APPOINTMENT (OUTPATIENT)
Dept: HEART AND VASCULAR | Facility: CLINIC | Age: 64
End: 2024-02-16
Payer: MEDICARE

## 2024-02-16 ENCOUNTER — NON-APPOINTMENT (OUTPATIENT)
Age: 64
End: 2024-02-16

## 2024-02-16 VITALS
SYSTOLIC BLOOD PRESSURE: 136 MMHG | DIASTOLIC BLOOD PRESSURE: 82 MMHG | HEART RATE: 70 BPM | OXYGEN SATURATION: 98 % | WEIGHT: 139 LBS | BODY MASS INDEX: 23.13 KG/M2

## 2024-02-16 DIAGNOSIS — Z01.818 ENCOUNTER FOR OTHER PREPROCEDURAL EXAMINATION: ICD-10-CM

## 2024-02-16 DIAGNOSIS — Z01.810 ENCOUNTER FOR PREPROCEDURAL CARDIOVASCULAR EXAMINATION: ICD-10-CM

## 2024-02-16 PROCEDURE — 93000 ELECTROCARDIOGRAM COMPLETE: CPT

## 2024-02-16 PROCEDURE — 99214 OFFICE O/P EST MOD 30 MIN: CPT | Mod: 25

## 2024-02-16 NOTE — HISTORY OF PRESENT ILLNESS
[FreeTextEntry1] : 64 y/o F former patient of Dr. Burr.    Being evaluated for kidney transplant.  Recent ECHO and treadmill nuclear stress were unremarkable, 10 METS no ischemia.  Mild/mod TR mildly elevated PASP high 30s.    No chest pain or sob.   Exercises regularly no limitations Monitors BP at home intermittently - mostly controlled.  Taken off metoprolol 2 months ago for bradycardia.  Started on hydralazine 10mg BID by nephrologist a few days ago.    Non smoker No etoh use  EKG NSR

## 2024-02-16 NOTE — ASSESSMENT
[FreeTextEntry1] : 64 y/o F former patient of Dr. Burr.  Hx HTN, HLD, CKD  Being evaluated for kidney transplant with possible nephew as donor.  Recent ECHO and treadmill nuclear stress Feb 2024 were unremarkable, 10 METS no ischemia.  Mild/mod TR mildly elevated PASP high 30s.  Normal LV and RV function.  EKG NSR  - No signs/sx of ischemia or heart failure - Continue hydralazine 10mg BID and norvasc 5mg BID - monitor BP at home, hydral recently added by nephrologist - Continue crestor 10mg and lovaza 2g BID - Check carotid US - very strong fhx vascular/carotid disease

## 2024-03-01 RX ORDER — METOPROLOL SUCCINATE 25 MG/1
25 TABLET, EXTENDED RELEASE ORAL
Qty: 45 | Refills: 1 | Status: ACTIVE | COMMUNITY
Start: 2024-03-01 | End: 1900-01-01

## 2024-03-06 ENCOUNTER — APPOINTMENT (OUTPATIENT)
Dept: HEART AND VASCULAR | Facility: CLINIC | Age: 64
End: 2024-03-06
Payer: COMMERCIAL

## 2024-03-06 PROCEDURE — 36415 COLL VENOUS BLD VENIPUNCTURE: CPT

## 2024-03-15 LAB
ALBUMIN SERPL ELPH-MCNC: 4.3 G/DL
ALP BLD-CCNC: 62 U/L
ALT SERPL-CCNC: 16 U/L
ANION GAP SERPL CALC-SCNC: 12 MMOL/L
AST SERPL-CCNC: 27 U/L
BASOPHILS # BLD AUTO: 0.01 K/UL
BASOPHILS NFR BLD AUTO: 0.2 %
BILIRUB SERPL-MCNC: 0.5 MG/DL
BUN SERPL-MCNC: 38 MG/DL
CALCIUM SERPL-MCNC: 9.6 MG/DL
CALCIUM SERPL-MCNC: 9.6 MG/DL
CHLORIDE SERPL-SCNC: 96 MMOL/L
CO2 SERPL-SCNC: 27 MMOL/L
CREAT SERPL-MCNC: 3.15 MG/DL
EGFR: 21 ML/MIN/1.73M2
EOSINOPHIL # BLD AUTO: 0.02 K/UL
EOSINOPHIL NFR BLD AUTO: 0.5 %
FERRITIN SERPL-MCNC: 59 NG/ML
GLUCOSE SERPL-MCNC: 114 MG/DL
HCT VFR BLD CALC: 44 %
HGB BLD-MCNC: 13.9 G/DL
IMM GRANULOCYTES NFR BLD AUTO: 0.2 %
IRON SATN MFR SERPL: 38 %
IRON SERPL-MCNC: 138 UG/DL
LYMPHOCYTES # BLD AUTO: 1.23 K/UL
LYMPHOCYTES NFR BLD AUTO: 29.4 %
MAN DIFF?: NORMAL
MCHC RBC-ENTMCNC: 28.4 PG
MCHC RBC-ENTMCNC: 31.6 GM/DL
MCV RBC AUTO: 89.8 FL
MONOCYTES # BLD AUTO: 0.45 K/UL
MONOCYTES NFR BLD AUTO: 10.7 %
NEUTROPHILS # BLD AUTO: 2.47 K/UL
NEUTROPHILS NFR BLD AUTO: 59 %
PARATHYROID HORMONE INTACT: 70 PG/ML
PHOSPHATE SERPL-MCNC: 3.1 MG/DL
PLATELET # BLD AUTO: 202 K/UL
POTASSIUM SERPL-SCNC: 4.1 MMOL/L
PROT SERPL-MCNC: 7 G/DL
RBC # BLD: 4.9 M/UL
RBC # FLD: 12.9 %
SODIUM SERPL-SCNC: 135 MMOL/L
TIBC SERPL-MCNC: 363 UG/DL
UIBC SERPL-MCNC: 225 UG/DL
WBC # FLD AUTO: 4.19 K/UL

## 2024-04-20 ENCOUNTER — RESULT REVIEW (OUTPATIENT)
Age: 64
End: 2024-04-20

## 2024-04-22 ENCOUNTER — RESULT REVIEW (OUTPATIENT)
Age: 64
End: 2024-04-22

## 2024-04-25 DIAGNOSIS — R93.1 ABNORMAL FINDINGS ON DIAGNOSTIC IMAGING OF HEART AND CORONARY CIRCULATION: ICD-10-CM

## 2024-04-25 RX ORDER — ROSUVASTATIN CALCIUM 20 MG/1
20 TABLET, FILM COATED ORAL DAILY
Qty: 90 | Refills: 3 | Status: ACTIVE | COMMUNITY
Start: 1900-01-01 | End: 1900-01-01

## 2024-05-01 ENCOUNTER — APPOINTMENT (OUTPATIENT)
Dept: CARDIOLOGY | Facility: CLINIC | Age: 64
End: 2024-05-01

## 2024-05-08 ENCOUNTER — TRANSCRIPTION ENCOUNTER (OUTPATIENT)
Age: 64
End: 2024-05-08

## 2024-05-14 ENCOUNTER — APPOINTMENT (OUTPATIENT)
Dept: HEART AND VASCULAR | Facility: CLINIC | Age: 64
End: 2024-05-14
Payer: MEDICARE

## 2024-05-14 DIAGNOSIS — E78.5 HYPERLIPIDEMIA, UNSPECIFIED: ICD-10-CM

## 2024-05-14 DIAGNOSIS — I65.22 OCCLUSION AND STENOSIS OF LEFT CAROTID ARTERY: ICD-10-CM

## 2024-05-14 PROCEDURE — 99213 OFFICE O/P EST LOW 20 MIN: CPT

## 2024-05-14 NOTE — PHYSICAL EXAM
[Well Developed] : well developed [Well Nourished] : well nourished [No Acute Distress] : no acute distress [Normal Conjunctiva] : normal conjunctiva [Normal Venous Pressure] : normal venous pressure [No Carotid Bruit] : no carotid bruit [Normal S1, S2] : normal S1, S2 [No Murmur] : no murmur [No Rub] : no rub [No Gallop] : no gallop [Clear Lung Fields] : clear lung fields [Good Air Entry] : good air entry [No Respiratory Distress] : no respiratory distress  [Soft] : abdomen soft [Non Tender] : non-tender [No Masses/organomegaly] : no masses/organomegaly [Normal Bowel Sounds] : normal bowel sounds [Moves all extremities] : moves all extremities [No Focal Deficits] : no focal deficits [Normal Speech] : normal speech [No ulcers] : no ulcers [No edema] : no edema [No varicosities] : no varicosities [No chronic venous stasis changes] : no chronic venous stasis changes [No cyanosis] : no cyanosis [No rashes] : no rashes [Normal peripheral pulses] : : normal peripheral pulses [de-identified] : FROM LAST VISIT

## 2024-05-14 NOTE — HISTORY OF PRESENT ILLNESS
[FreeTextEntry1] : 64 y/o F former patient of Dr. Burr.    Being evaluated for kidney transplant.  Recent ECHO and treadmill nuclear stress were unremarkable, 10 METS no ischemia.  Mild/mod TR mildly elevated PASP high 30s.    No chest pain or sob.   Exercises regularly no limitations Monitors BP at home intermittently - mostly controlled.  Taken off metoprolol 2 months ago for bradycardia.  Started on hydralazine 10mg BID by nephrologist a few days ago.   Non smoker No etoh use  5/14/24 TELE:  notes he is anxious, has coronary angio on 5/23/34 at [place].  Set up after our discussion regarding his very high calcium score.  We increased crestor and started aspirin after result. Recent labsTotal 157, , HDL 79, LDL 59 on 5/7/24 labs.  Feeling ok, no complaints with aspirin/crestor

## 2024-05-14 NOTE — ASSESSMENT
[FreeTextEntry1] : 63F former patient of Dr. Burr.  Hx HTN, HLD, CKD  Being evaluated for kidney transplant with possible nephew as donor.  Recent ECHO and treadmill nuclear stress Feb 2024 were unremarkable, 10 METS no ischemia.  Mild/mod TR mildly elevated PASP high 30s.  Normal LV and RV function.  Coronary calcium score was severe at 765 Agatston units.  Mild TRESA plaque on US.  EKG NSR   - Continue hydralazine 10mg BID and norvasc 5mg BID (hydral added by nephrologist - Continue crestor 20mg and lovaza 2g BID - Continue baby aspirin for CAD/carotid plaque - Plan for coronary angiogram with [facility] next week.  He has appt with us the following week.   No signs of unstable CAD.

## 2024-05-28 ENCOUNTER — NON-APPOINTMENT (OUTPATIENT)
Age: 64
End: 2024-05-28

## 2024-05-28 ENCOUNTER — APPOINTMENT (OUTPATIENT)
Dept: HEART AND VASCULAR | Facility: CLINIC | Age: 64
End: 2024-05-28
Payer: MEDICARE

## 2024-05-28 VITALS
WEIGHT: 128 LBS | BODY MASS INDEX: 21.33 KG/M2 | SYSTOLIC BLOOD PRESSURE: 118 MMHG | HEIGHT: 65 IN | DIASTOLIC BLOOD PRESSURE: 58 MMHG | OXYGEN SATURATION: 99 % | HEART RATE: 71 BPM

## 2024-05-28 DIAGNOSIS — I65.29 OCCLUSION AND STENOSIS OF UNSPECIFIED CAROTID ARTERY: ICD-10-CM

## 2024-05-28 DIAGNOSIS — I25.10 ATHEROSCLEROTIC HEART DISEASE OF NATIVE CORONARY ARTERY W/OUT ANGINA PECTORIS: ICD-10-CM

## 2024-05-28 DIAGNOSIS — Z95.5 PRESENCE OF CORONARY ANGIOPLASTY IMPLANT AND GRAFT: ICD-10-CM

## 2024-05-28 PROCEDURE — 99214 OFFICE O/P EST MOD 30 MIN: CPT

## 2024-05-28 PROCEDURE — 93000 ELECTROCARDIOGRAM COMPLETE: CPT

## 2024-05-28 NOTE — PHYSICAL EXAM
[Well Developed] : well developed [Well Nourished] : well nourished [No Acute Distress] : no acute distress [Normal Conjunctiva] : normal conjunctiva [Normal Venous Pressure] : normal venous pressure [No Carotid Bruit] : no carotid bruit [Normal S1, S2] : normal S1, S2 [No Murmur] : no murmur [No Rub] : no rub [No Gallop] : no gallop [Clear Lung Fields] : clear lung fields [Good Air Entry] : good air entry [No Respiratory Distress] : no respiratory distress  [Soft] : abdomen soft [Non Tender] : non-tender [No Masses/organomegaly] : no masses/organomegaly [Normal Bowel Sounds] : normal bowel sounds [Moves all extremities] : moves all extremities [No Focal Deficits] : no focal deficits [Normal Speech] : normal speech [No ulcers] : no ulcers [No edema] : no edema [No varicosities] : no varicosities [No chronic venous stasis changes] : no chronic venous stasis changes [No cyanosis] : no cyanosis [No rashes] : no rashes [Normal peripheral pulses] : : normal peripheral pulses [de-identified] : right groin ecchymosis, non tender soft, small hardened hematoma at access site.  distal pulses intact, warm ext.

## 2024-05-28 NOTE — HISTORY OF PRESENT ILLNESS
[FreeTextEntry1] : 63F former patient of Dr. Burr.    Being evaluated for kidney transplant.  Recent ECHO and treadmill nuclear stress were unremarkable, 10 METS no ischemia.  Mild/mod TR mildly elevated PASP high 30s.    No chest pain or sob.   Exercises regularly no limitations Monitors BP at home intermittently - mostly controlled.  Taken off metoprolol 2 months ago for bradycardia.  Started on hydralazine 10mg BID by nephrologist a few days ago.   Non smoker No etoh use  5/14/24 TELE:  notes he is anxious, has coronary angio on 5/23/34 at [place].  Set up after our discussion regarding his very high calcium score.  We increased crestor and started aspirin after result. Recent labsTotal 157, , HDL 79, LDL 59 on 5/7/24 labs.  Feeling ok, no complaints with aspirin/crestor  5/28/24:  since last visit had significantly elevated calcium score, advised to start aspirin and increase crestor to 20mg.  He was subsequently seen by his [place] transplant team and had invasive angio with 80% mid RCA stenosis treated with a FARHANA.  Rest of vessels with mild/minimal plaque.  Since that time he has had no change in symptoms.  He did not have chest pain or sob prior.  Right groin access site with bruising.  No pain/swelling/weakness/claudication in RLE.

## 2024-06-10 ENCOUNTER — APPOINTMENT (OUTPATIENT)
Dept: HEART AND VASCULAR | Facility: CLINIC | Age: 64
End: 2024-06-10
Payer: MEDICARE

## 2024-06-10 PROCEDURE — 36415 COLL VENOUS BLD VENIPUNCTURE: CPT

## 2024-06-12 ENCOUNTER — APPOINTMENT (OUTPATIENT)
Dept: NEPHROLOGY | Facility: CLINIC | Age: 64
End: 2024-06-12
Payer: COMMERCIAL

## 2024-06-12 VITALS
OXYGEN SATURATION: 99 % | WEIGHT: 131.5 LBS | HEIGHT: 65 IN | HEART RATE: 74 BPM | TEMPERATURE: 97.8 F | BODY MASS INDEX: 21.91 KG/M2 | RESPIRATION RATE: 18 BRPM | SYSTOLIC BLOOD PRESSURE: 122 MMHG | DIASTOLIC BLOOD PRESSURE: 64 MMHG

## 2024-06-12 DIAGNOSIS — N17.9 ACUTE KIDNEY FAILURE, UNSPECIFIED: ICD-10-CM

## 2024-06-12 DIAGNOSIS — N18.4 CHRONIC KIDNEY DISEASE, STAGE 4 (SEVERE): ICD-10-CM

## 2024-06-12 DIAGNOSIS — N25.81 SECONDARY HYPERPARATHYROIDISM OF RENAL ORIGIN: ICD-10-CM

## 2024-06-12 DIAGNOSIS — I10 ESSENTIAL (PRIMARY) HYPERTENSION: ICD-10-CM

## 2024-06-12 LAB
25(OH)D3 SERPL-MCNC: 63 NG/ML
ALBUMIN SERPL ELPH-MCNC: 4.5 G/DL
ALP BLD-CCNC: 62 U/L
ALT SERPL-CCNC: 27 U/L
ANION GAP SERPL CALC-SCNC: 19 MMOL/L
APPEARANCE: CLEAR
AST SERPL-CCNC: 45 U/L
BACTERIA: NEGATIVE /HPF
BASOPHILS # BLD AUTO: 0.03 K/UL
BASOPHILS NFR BLD AUTO: 0.8 %
BILIRUB SERPL-MCNC: 0.4 MG/DL
BILIRUBIN URINE: NEGATIVE
BLOOD URINE: NEGATIVE
BUN SERPL-MCNC: 35 MG/DL
CALCIUM SERPL-MCNC: 10.6 MG/DL
CALCIUM SERPL-MCNC: 10.6 MG/DL
CAST: 0 /LPF
CHLORIDE SERPL-SCNC: 96 MMOL/L
CHOLEST SERPL-MCNC: 155 MG/DL
CO2 SERPL-SCNC: 26 MMOL/L
COLOR: YELLOW
CREAT SERPL-MCNC: 3.75 MG/DL
CREAT SPEC-SCNC: 55 MG/DL
CREAT/PROT UR: 1.6 RATIO
EGFR: 17 ML/MIN/1.73M2
EOSINOPHIL # BLD AUTO: 0.05 K/UL
EOSINOPHIL NFR BLD AUTO: 1.3 %
EPITHELIAL CELLS: 0 /HPF
FERRITIN SERPL-MCNC: 93 NG/ML
GLUCOSE QUALITATIVE U: NEGATIVE MG/DL
GLUCOSE SERPL-MCNC: 95 MG/DL
HCT VFR BLD CALC: 44.1 %
HDLC SERPL-MCNC: 84 MG/DL
HGB BLD-MCNC: 13.6 G/DL
IMM GRANULOCYTES NFR BLD AUTO: 0 %
IRON SATN MFR SERPL: 23 %
IRON SERPL-MCNC: 86 UG/DL
KETONES URINE: NEGATIVE MG/DL
LDLC SERPL CALC-MCNC: 55 MG/DL
LEUKOCYTE ESTERASE URINE: NEGATIVE
LYMPHOCYTES # BLD AUTO: 0.7 K/UL
LYMPHOCYTES NFR BLD AUTO: 17.6 %
MAN DIFF?: NORMAL
MCHC RBC-ENTMCNC: 29.1 PG
MCHC RBC-ENTMCNC: 30.8 GM/DL
MCV RBC AUTO: 94.2 FL
MICROSCOPIC-UA: NORMAL
MONOCYTES # BLD AUTO: 0.66 K/UL
MONOCYTES NFR BLD AUTO: 16.6 %
NEUTROPHILS # BLD AUTO: 2.54 K/UL
NEUTROPHILS NFR BLD AUTO: 63.7 %
NITRITE URINE: NEGATIVE
NONHDLC SERPL-MCNC: 71 MG/DL
PARATHYROID HORMONE INTACT: 26 PG/ML
PH URINE: 6.5
PHOSPHATE SERPL-MCNC: 4.3 MG/DL
PLATELET # BLD AUTO: 222 K/UL
POTASSIUM SERPL-SCNC: 4.4 MMOL/L
PROT SERPL-MCNC: 7.2 G/DL
PROT UR-MCNC: 90 MG/DL
PROTEIN URINE: 100 MG/DL
RBC # BLD: 4.68 M/UL
RBC # FLD: 14.3 %
RED BLOOD CELLS URINE: 0 /HPF
SODIUM SERPL-SCNC: 141 MMOL/L
SPECIFIC GRAVITY URINE: 1.01
TIBC SERPL-MCNC: 378 UG/DL
TRIGL SERPL-MCNC: 93 MG/DL
UIBC SERPL-MCNC: 292 UG/DL
URATE SERPL-MCNC: 7.9 MG/DL
UROBILINOGEN URINE: 0.2 MG/DL
WBC # FLD AUTO: 3.98 K/UL
WHITE BLOOD CELLS URINE: 0 /HPF

## 2024-06-12 PROCEDURE — 99214 OFFICE O/P EST MOD 30 MIN: CPT

## 2024-06-12 RX ORDER — ASPIRIN ENTERIC COATED TABLETS 81 MG 81 MG/1
81 TABLET, DELAYED RELEASE ORAL DAILY
Refills: 0 | Status: ACTIVE | COMMUNITY

## 2024-06-12 RX ORDER — HYDRALAZINE HYDROCHLORIDE 25 MG/1
25 TABLET ORAL
Qty: 180 | Refills: 2 | Status: ACTIVE | COMMUNITY
Start: 2024-02-15 | End: 1900-01-01

## 2024-06-12 RX ORDER — AMLODIPINE BESYLATE 10 MG/1
10 TABLET ORAL
Qty: 90 | Refills: 0 | Status: ACTIVE | COMMUNITY
Start: 2021-04-06 | End: 1900-01-01

## 2024-06-12 RX ORDER — TICAGRELOR 90 MG/1
90 TABLET ORAL TWICE DAILY
Refills: 0 | Status: ACTIVE | COMMUNITY

## 2024-06-12 NOTE — PHYSICAL EXAM
[General Appearance - Alert] : alert [General Appearance - In No Acute Distress] : in no acute distress [Sclera] : the sclera and conjunctiva were normal [Extraocular Movements] : extraocular movements were intact [Neck Appearance] : the appearance of the neck was normal [] : no respiratory distress [Respiration, Rhythm And Depth] : normal respiratory rhythm and effort [Auscultation Breath Sounds / Voice Sounds] : lungs were clear to auscultation bilaterally [Exaggerated Use Of Accessory Muscles For Inspiration] : no accessory muscle use [Heart Rate And Rhythm] : heart rate was normal and rhythm regular [Heart Sounds] : normal S1 and S2 [Heart Sounds Gallop] : no gallops [Murmurs] : no murmurs [Heart Sounds Pericardial Friction Rub] : no pericardial rub [Bowel Sounds] : normal bowel sounds [Abdomen Soft] : soft [Abdomen Tenderness] : non-tender [Abnormal Walk] : normal gait [Involuntary Movements] : no involuntary movements were seen [Skin Color & Pigmentation] : normal skin color and pigmentation [Skin Turgor] : normal skin turgor [No Focal Deficits] : no focal deficits [Oriented To Time, Place, And Person] : oriented to person, place, and time [Impaired Insight] : insight and judgment were intact [Affect] : the affect was normal [Mood] : the mood was normal [FreeTextEntry1] : no asterixis or clonus

## 2024-06-12 NOTE — HISTORY OF PRESENT ILLNESS
[FreeTextEntry1] : Ramses Jackson is a 63-year-old, male who has a history of CKD related to urinary reflux.  I had him stop the Micardis and hydrochlorothiazide due to worsening renal function late in 2020. His blood pressure increased.  I put him on metoprolol succinate.  The dose was decreased after his heart rate decreased to 42 bpm.    Mr. Jackson is here for follow up.  He is accompanied by his wife. He has been in touch with his transplant coordinator, Donya Grimes at [facility].  He feels "good" though he is a bit nervous because of an increase in his serum creatinine.  He underwent cardiac catheterization with stent placement in the RCA on 05/23/2024.

## 2024-06-12 NOTE — REVIEW OF SYSTEMS
[Feeling Tired] : feeling tired [Lower Ext Edema] : lower extremity edema [As Noted in HPI] : as noted in HPI [Easy Bruising] : a tendency for easy bruising [Negative] : Heme/Lymph [FreeTextEntry2] : weight has been stable. [FreeTextEntry3] : swelling underneath the eyes "for a long time" [FreeTextEntry7] : appetite has been good.  No N/V.  [FreeTextEntry8] : nocturia x 1-2,  urinates every 3-4 hours during the days, drinks a lot of water

## 2024-06-12 NOTE — ASSESSMENT
[FreeTextEntry1] :  Ramses Jackson is a 63-year-old male who has stage IV CKD related to prior urinary reflux and a solitary functioning kidney. The BUN/creatinine levels have trended as follows: 32/2.56 (01/04/2020), 262.33 (02/08/2021), 31/2.46 (04/02/2021), 32/2.56 (07/15/2021), 50/2.5 (09/20/2021), 41/2.40 (02/14/2022), 37/2.76 (08/12/2022), 37/2.48 (12/20/2022), 34/2.54 (06/02/2023), 32/2.81 (08/31/2023), 35/2.87 (10/31/2023), 44/3.13 (12/06/2023), 39/3.2 (12/22/2023), --> 38/2.15 (03/07/2024) --> cardiac stent (05/23/2024) --> 35/3.75 (06/11/2024). The current eGFR is 17  mL per minute. He is without any nausea or vomiting. His blood pressure is excellent. Urine protein to creatinine ratio remains at 1.6 grams per gram.  IMPRESSION:  (1) Solitary functioning kidney with prior nephrotic-range proteinuria in the past with a negative serologic workup.  (2) CKD stage IV secondary to reflux. No active reflux at his last visit to urology.  Worsening of renal function.  Unclear if this is related to the use of IV contrast or progression of the underlying disease.  -Stay well hydrated -Stay off of ARB due to prior hyperkalemia -Repeat renal function tests in 2 weeks. -Keep salt out of the diet. Limit animal protein in the diet. -Follow the renal transplant at [facility]  (3) Complex renal cyst. No change in structure on the last imaging study. Low suspicion for a malignancy.  (4) Hypertension Excellent control. -Continue current regimen  (6) Elevated intact  pg/mL --> 60 pg/mL --> 26 pg/mL (06/11/2024).  25 hydroxy vitamin D level is 63 ng/mL on vitamin D3 2000 IU once daily.  The serum calcium is elevated at 10.6 mg/dL.   -Lower vitamin D3 from 2000 to 1000 IU once daily.   (7) Mild hypercalcemia.  See above.     We talked about home dialysis (PD and HD) during a prior meeting. Ramses watched a Youtube video entitled "Failing Kidneys and Different Treatment Options". He prefers to start peritoneal dialysis when RRT is required. Given that Mr. Jackson has a possible kidney donor and is asymptomatic, will not have access placed at this time.  Return in 3 months with the lab studies below.

## 2024-06-26 LAB
ANION GAP SERPL CALC-SCNC: 11 MMOL/L
BUN SERPL-MCNC: 31 MG/DL
CALCIUM SERPL-MCNC: 9.4 MG/DL
CHLORIDE SERPL-SCNC: 94 MMOL/L
CO2 SERPL-SCNC: 28 MMOL/L
CREAT SERPL-MCNC: 3.63 MG/DL
EGFR: 18 ML/MIN/1.73M2
GLUCOSE SERPL-MCNC: 102 MG/DL
POTASSIUM SERPL-SCNC: 4.1 MMOL/L
SODIUM SERPL-SCNC: 133 MMOL/L

## 2024-09-13 ENCOUNTER — RX RENEWAL (OUTPATIENT)
Age: 64
End: 2024-09-13

## 2024-09-30 ENCOUNTER — APPOINTMENT (OUTPATIENT)
Dept: NEPHROLOGY | Facility: CLINIC | Age: 64
End: 2024-09-30

## 2024-11-26 ENCOUNTER — APPOINTMENT (OUTPATIENT)
Dept: HEART AND VASCULAR | Facility: CLINIC | Age: 64
End: 2024-11-26
Payer: MEDICARE

## 2024-11-26 ENCOUNTER — NON-APPOINTMENT (OUTPATIENT)
Age: 64
End: 2024-11-26

## 2024-11-26 VITALS
HEART RATE: 75 BPM | WEIGHT: 128 LBS | BODY MASS INDEX: 21.3 KG/M2 | DIASTOLIC BLOOD PRESSURE: 72 MMHG | SYSTOLIC BLOOD PRESSURE: 150 MMHG | OXYGEN SATURATION: 99 %

## 2024-11-26 DIAGNOSIS — I10 ESSENTIAL (PRIMARY) HYPERTENSION: ICD-10-CM

## 2024-11-26 DIAGNOSIS — E78.5 HYPERLIPIDEMIA, UNSPECIFIED: ICD-10-CM

## 2024-11-26 DIAGNOSIS — Z95.5 PRESENCE OF CORONARY ANGIOPLASTY IMPLANT AND GRAFT: ICD-10-CM

## 2024-11-26 DIAGNOSIS — I65.22 OCCLUSION AND STENOSIS OF LEFT CAROTID ARTERY: ICD-10-CM

## 2024-11-26 DIAGNOSIS — I25.10 ATHEROSCLEROTIC HEART DISEASE OF NATIVE CORONARY ARTERY W/OUT ANGINA PECTORIS: ICD-10-CM

## 2024-11-26 DIAGNOSIS — N18.4 CHRONIC KIDNEY DISEASE, STAGE 4 (SEVERE): ICD-10-CM

## 2024-11-26 DIAGNOSIS — Z94.0 KIDNEY TRANSPLANT STATUS: ICD-10-CM

## 2024-11-26 PROCEDURE — 99214 OFFICE O/P EST MOD 30 MIN: CPT

## 2024-11-26 PROCEDURE — 93000 ELECTROCARDIOGRAM COMPLETE: CPT

## 2024-11-26 PROCEDURE — G2211 COMPLEX E/M VISIT ADD ON: CPT

## 2024-11-27 LAB
ALBUMIN SERPL ELPH-MCNC: 4.4 G/DL
ALP BLD-CCNC: 78 U/L
ALT SERPL-CCNC: 9 U/L
ANION GAP SERPL CALC-SCNC: 17 MMOL/L
AST SERPL-CCNC: 16 U/L
BILIRUB SERPL-MCNC: 0.4 MG/DL
BUN SERPL-MCNC: 18 MG/DL
CALCIUM SERPL-MCNC: 9.2 MG/DL
CHLORIDE SERPL-SCNC: 100 MMOL/L
CHOLEST SERPL-MCNC: 146 MG/DL
CO2 SERPL-SCNC: 20 MMOL/L
CREAT SERPL-MCNC: 1.1 MG/DL
EGFR: 74 ML/MIN/1.73M2
ESTIMATED AVERAGE GLUCOSE: 105 MG/DL
GLUCOSE SERPL-MCNC: 116 MG/DL
HBA1C MFR BLD HPLC: 5.3 %
HCT VFR BLD CALC: 39.9 %
HDLC SERPL-MCNC: 74 MG/DL
HGB BLD-MCNC: 12.7 G/DL
LDLC SERPL CALC-MCNC: 50 MG/DL
MCHC RBC-ENTMCNC: 29.7 PG
MCHC RBC-ENTMCNC: 31.8 G/DL
MCV RBC AUTO: 93.4 FL
NONHDLC SERPL-MCNC: 72 MG/DL
PLATELET # BLD AUTO: 182 K/UL
POTASSIUM SERPL-SCNC: 4.6 MMOL/L
PROT SERPL-MCNC: 6.8 G/DL
RBC # BLD: 4.27 M/UL
RBC # FLD: 14.5 %
SODIUM SERPL-SCNC: 137 MMOL/L
TRIGL SERPL-MCNC: 130 MG/DL
TSH SERPL-ACNC: 0.38 UIU/ML
WBC # FLD AUTO: 2.9 K/UL

## 2025-01-14 ENCOUNTER — APPOINTMENT (OUTPATIENT)
Dept: HEART AND VASCULAR | Facility: CLINIC | Age: 65
End: 2025-01-14

## 2025-02-04 ENCOUNTER — APPOINTMENT (OUTPATIENT)
Dept: PULMONOLOGY | Facility: CLINIC | Age: 65
End: 2025-02-04

## 2025-02-26 NOTE — PRE-ANESTHESIA EVALUATION ADULT - LAST ECHOCARDIOGRAM
----- Message from Jamia Lisa NP sent at 2/26/2025  4:31 PM CST -----  No phlebotomy needed.  Can cancel phlebotomy.  Reschedule labs in 6 weeks with labs prior - possible phlebotomy - cbc, cmp, iron studies placed.  ----- Message -----  From: Porfirio Singh LPN  Sent: 2/26/2025   9:27 AM CST  To: Jamia Lisa NP    Will patient still require the infusion scheduled for Friday after reviewing the labs from 2/21? Wife just wants to know if appointment should be kept, canceled, or rescheduled.  ----- Message -----  From: Teresita Helms  Sent: 2/26/2025   9:20 AM CST  To: S[facility]landon Staff    Type: Patient callWho called: Patient Wife (Vonnie)Does the patient know what this is regarding? Requesting a call back in regards to confirm if the dr checked his labs and he still needs the infusion on Friday; please adviseWould the patient rather a call back or response via [facility]? [facility] back number: [phone] information:  
Attempted to call patient and there was no answer. Voicemail was left for patient to contact the clinic at their earliest convenience.    
Noted If In Chart

## 2025-06-04 NOTE — HISTORY OF PRESENT ILLNESS
Patient notified and voiced understanding.    [FreeTextEntry1] : Ramses Jackson is a 59-year-old, male who has a history of Stage III CKD related to urinary reflux. He\par is here for follow up. Mr. Jackson has been doing well. His wife reports that he becomes very tired at\par the end of the day though Mr. Jackson reports "it is less".   His blood pressures have been around 110/75. Mr. Jackson came to accompanied by his wife. He has been taking an Ayurvedic medications for\par his hypertension for the past 5-6 years.  Mr. Jackson visited the [facility]. \par

## 2025-06-10 ENCOUNTER — APPOINTMENT (OUTPATIENT)
Dept: HEART AND VASCULAR | Facility: CLINIC | Age: 65
End: 2025-06-10
Payer: MEDICARE

## 2025-06-10 VITALS
DIASTOLIC BLOOD PRESSURE: 68 MMHG | OXYGEN SATURATION: 99 % | WEIGHT: 130 LBS | HEART RATE: 76 BPM | SYSTOLIC BLOOD PRESSURE: 130 MMHG | BODY MASS INDEX: 21.63 KG/M2

## 2025-06-10 PROCEDURE — G2211 COMPLEX E/M VISIT ADD ON: CPT

## 2025-06-10 PROCEDURE — 99214 OFFICE O/P EST MOD 30 MIN: CPT

## 2025-06-10 PROCEDURE — 93000 ELECTROCARDIOGRAM COMPLETE: CPT

## 2025-07-08 ENCOUNTER — APPOINTMENT (OUTPATIENT)
Dept: NEPHROLOGY | Facility: CLINIC | Age: 65
End: 2025-07-08
Payer: MEDICARE

## 2025-07-08 VITALS
SYSTOLIC BLOOD PRESSURE: 128 MMHG | TEMPERATURE: 98.4 F | OXYGEN SATURATION: 99 % | WEIGHT: 135 LBS | DIASTOLIC BLOOD PRESSURE: 64 MMHG | HEART RATE: 72 BPM | BODY MASS INDEX: 22.49 KG/M2 | RESPIRATION RATE: 17 BRPM | HEIGHT: 65 IN

## 2025-07-08 PROBLEM — B25.9 CMV (CYTOMEGALOVIRUS): Status: ACTIVE | Noted: 2025-07-08

## 2025-07-08 PROBLEM — B33.8 BK VIRUS NEPHROPATHY: Status: ACTIVE | Noted: 2025-07-08

## 2025-07-08 PROCEDURE — 99215 OFFICE O/P EST HI 40 MIN: CPT

## 2025-07-08 RX ORDER — TACROLIMUS 1 MG/1
1 CAPSULE ORAL
Refills: 0 | Status: ACTIVE | COMMUNITY

## 2025-07-08 RX ORDER — ATOVAQUONE 750 MG/5ML
750 SUSPENSION ORAL
Refills: 0 | Status: ACTIVE | COMMUNITY

## 2025-07-08 RX ORDER — MYCOPHENOLATE MOFETIL 250 MG/1
250 CAPSULE ORAL TWICE DAILY
Refills: 0 | Status: ACTIVE | COMMUNITY

## 2025-07-08 RX ORDER — TAMSULOSIN HYDROCHLORIDE 0.4 MG/1
0.4 CAPSULE ORAL
Refills: 0 | Status: ACTIVE | COMMUNITY

## 2025-07-08 RX ORDER — GABAPENTIN 300 MG/1
300 CAPSULE ORAL
Refills: 0 | Status: ACTIVE | COMMUNITY

## (undated) DEVICE — WARMING BLANKET LOWER ADULT

## (undated) DEVICE — DRAPE MICROSCOPE KNOB COVER SMALL (2 PCS)

## (undated) DEVICE — PACK VITRECTOMY  LF

## (undated) DEVICE — APPLICATOR COTTON TIP 3" STERILE

## (undated) DEVICE — GLV 7.5 PROTEXIS (WHITE)

## (undated) DEVICE — Device

## (undated) DEVICE — SUT SILK 3-0 18" TIES

## (undated) DEVICE — ELCTR WETFIELD ERASER FINE TIP 25GA